# Patient Record
Sex: FEMALE | Race: WHITE | NOT HISPANIC OR LATINO
[De-identification: names, ages, dates, MRNs, and addresses within clinical notes are randomized per-mention and may not be internally consistent; named-entity substitution may affect disease eponyms.]

---

## 2018-08-27 ENCOUNTER — RX RENEWAL (OUTPATIENT)
Age: 58
End: 2018-08-27

## 2018-10-26 ENCOUNTER — RECORD ABSTRACTING (OUTPATIENT)
Age: 58
End: 2018-10-26

## 2018-10-26 DIAGNOSIS — Z82.49 FAMILY HISTORY OF ISCHEMIC HEART DISEASE AND OTHER DISEASES OF THE CIRCULATORY SYSTEM: ICD-10-CM

## 2018-10-26 DIAGNOSIS — N95.1 MENOPAUSAL AND FEMALE CLIMACTERIC STATES: ICD-10-CM

## 2018-10-26 DIAGNOSIS — Z78.9 OTHER SPECIFIED HEALTH STATUS: ICD-10-CM

## 2018-10-26 DIAGNOSIS — Z80.9 FAMILY HISTORY OF MALIGNANT NEOPLASM, UNSPECIFIED: ICD-10-CM

## 2018-10-30 ENCOUNTER — APPOINTMENT (OUTPATIENT)
Dept: ENDOCRINOLOGY | Facility: CLINIC | Age: 58
End: 2018-10-30
Payer: COMMERCIAL

## 2018-10-30 VITALS
WEIGHT: 170 LBS | HEART RATE: 74 BPM | HEIGHT: 67 IN | BODY MASS INDEX: 26.68 KG/M2 | DIASTOLIC BLOOD PRESSURE: 86 MMHG | SYSTOLIC BLOOD PRESSURE: 140 MMHG

## 2018-10-30 PROCEDURE — 99213 OFFICE O/P EST LOW 20 MIN: CPT

## 2019-01-11 ENCOUNTER — APPOINTMENT (OUTPATIENT)
Dept: CARDIOLOGY | Facility: CLINIC | Age: 59
End: 2019-01-11
Payer: COMMERCIAL

## 2019-01-11 VITALS
RESPIRATION RATE: 16 BRPM | DIASTOLIC BLOOD PRESSURE: 80 MMHG | WEIGHT: 180 LBS | BODY MASS INDEX: 28.25 KG/M2 | SYSTOLIC BLOOD PRESSURE: 139 MMHG | HEART RATE: 87 BPM | HEIGHT: 67 IN

## 2019-01-11 DIAGNOSIS — Z00.00 ENCOUNTER FOR GENERAL ADULT MEDICAL EXAMINATION W/OUT ABNORMAL FINDINGS: ICD-10-CM

## 2019-01-11 PROCEDURE — 93000 ELECTROCARDIOGRAM COMPLETE: CPT

## 2019-01-11 PROCEDURE — 99244 OFF/OP CNSLTJ NEW/EST MOD 40: CPT

## 2019-01-22 ENCOUNTER — APPOINTMENT (OUTPATIENT)
Dept: ENDOCRINOLOGY | Facility: CLINIC | Age: 59
End: 2019-01-22
Payer: COMMERCIAL

## 2019-01-22 VITALS
HEIGHT: 67 IN | OXYGEN SATURATION: 96 % | WEIGHT: 179 LBS | DIASTOLIC BLOOD PRESSURE: 72 MMHG | BODY MASS INDEX: 28.09 KG/M2 | SYSTOLIC BLOOD PRESSURE: 119 MMHG | HEART RATE: 86 BPM

## 2019-01-22 PROCEDURE — 99213 OFFICE O/P EST LOW 20 MIN: CPT

## 2019-01-22 NOTE — DATA REVIEWED
[FreeTextEntry1] : Thyroid sono 6/13/18\par Left nodule 9x7x7 mm, stable\par \par Labs 10/18/18\par TSH 0.508\par FT4 1.79\par \par Labs 1/14/19\par TSH 0.681\par FT4 1.45

## 2019-01-22 NOTE — ASSESSMENT
[FreeTextEntry1] : 1. hashimoto's hypothyrod - TSh low normal but she is asymtomatic and levels stable\par no changes in Lt4 dose, repeat TFTs 1 week before next visit\par \par 2. stable thyroid nodule - repeat thyroid sono summer 2019

## 2019-01-22 NOTE — REVIEW OF SYSTEMS
[Insomnia] : insomnia [As Noted in HPI] : as noted in HPI [Recent Weight Gain (___ Lbs)] : no recent weight gain [Recent Weight Loss (___ Lbs)] : no recent weight loss [Palpitations] : no palpitations [Shortness Of Breath] : no shortness of breath [SOB on Exertion] : no shortness of breath during exertion [Constipation] : no constipation [Diarrhea] : no diarrhea [Tremors] : no tremors [Depression] : no depression [Anxiety] : no anxiety [Stress] : no stress [Cold Intolerance] : cold tolerant [Heat Intolerance] : heat tolerant

## 2019-01-22 NOTE — HISTORY OF PRESENT ILLNESS
[FreeTextEntry1] : on Lt4 112 mcg daily on empty stomach\par weight stable, denies palpitations/increased bowel habits\par denies dysphagia, voice changes, anterior neck pain

## 2019-01-22 NOTE — PHYSICAL EXAM
[Alert] : alert [Well Nourished] : well nourished [Well Developed] : well developed [Normal Sclera/Conjunctiva] : normal sclera/conjunctiva [EOMI] : extra ocular movement intact [No LAD] : no lymphadenopathy [Thyroid Not Enlarged] : the thyroid was not enlarged [No Thyroid Nodules] : there were no palpable thyroid nodules [Normal Rate] : heart rate was normal  [Normal S1, S2] : normal S1 and S2 [Normal Reflexes] : deep tendon reflexes were 2+ and symmetric [No Tremors] : no tremors [Oriented x3] : oriented to person, place, and time [Normal Insight/Judgement] : insight and judgment were intact

## 2019-02-25 ENCOUNTER — APPOINTMENT (OUTPATIENT)
Dept: CARDIOLOGY | Facility: CLINIC | Age: 59
End: 2019-02-25
Payer: COMMERCIAL

## 2019-02-25 PROCEDURE — 93306 TTE W/DOPPLER COMPLETE: CPT

## 2019-02-25 PROCEDURE — 93015 CV STRESS TEST SUPVJ I&R: CPT

## 2019-03-01 ENCOUNTER — APPOINTMENT (OUTPATIENT)
Dept: CARDIOLOGY | Facility: CLINIC | Age: 59
End: 2019-03-01
Payer: COMMERCIAL

## 2019-03-01 ENCOUNTER — NON-APPOINTMENT (OUTPATIENT)
Age: 59
End: 2019-03-01

## 2019-03-01 VITALS
RESPIRATION RATE: 16 BRPM | WEIGHT: 189 LBS | HEART RATE: 80 BPM | BODY MASS INDEX: 29.66 KG/M2 | DIASTOLIC BLOOD PRESSURE: 82 MMHG | HEIGHT: 67 IN | SYSTOLIC BLOOD PRESSURE: 136 MMHG

## 2019-03-01 PROCEDURE — 99215 OFFICE O/P EST HI 40 MIN: CPT

## 2019-03-01 PROCEDURE — 93000 ELECTROCARDIOGRAM COMPLETE: CPT

## 2019-03-01 NOTE — CARDIOLOGY SUMMARY
[No Exercise Ind Arr] : no exercise induced arrhythmias [___] : [unfilled] [LVEF ___%] : LVEF [unfilled]% [None] : no pulmonary hypertension [Normal] : normal LA size [Mild] : mild mitral regurgitation

## 2019-03-01 NOTE — REASON FOR VISIT
[FreeTextEntry1] : The patient presents back to the office today for cardiac reevaluation with known history of atypical chest pain syndrome, abnormal EKG and GERD.  Also, Mrs. Sow is here today to review her most recent Exercise Stress Test and Echocardiogram results.  She continues to report right parasternal chest pain that occurs mostly at rest but occasionally with activity.  The patient denies SOB, OSBORNE, PND, orthopnea, palpitations, presyncope, syncope, diaphoresis.

## 2019-03-01 NOTE — DISCUSSION/SUMMARY
[FreeTextEntry1] : 1).  Exercise Nuclear Stress Test to be completed to assess for coronary perfusion in light of her intermittent chest pain symptoms and positive exercise stress test.\par \par 2).  Follow up with PCP (Dr. Grove) regarding routine checkups and blood work, have copy faxed to our office. \par \par 3).  Recommend patient report any untoward symptoms. \par \par 4).  Follow up with our office in 5 to 6 months or PRN.

## 2019-03-01 NOTE — PHYSICAL EXAM
[General Appearance - Well Developed] : well developed [Normal Appearance] : normal appearance [General Appearance - Well Nourished] : well nourished [General Appearance - In No Acute Distress] : no acute distress [Normal Conjunctiva] : the conjunctiva exhibited no abnormalities [Normal Oral Mucosa] : normal oral mucosa [Normal Oropharynx] : normal oropharynx [Normal Jugular Venous A Waves Present] : normal jugular venous A waves present [Normal Jugular Venous V Waves Present] : normal jugular venous V waves present [No Jugular Venous Ward A Waves] : no jugular venous ward A waves [] : no respiratory distress [Respiration, Rhythm And Depth] : normal respiratory rhythm and effort [Auscultation Breath Sounds / Voice Sounds] : lungs were clear to auscultation bilaterally [Heart Rate And Rhythm] : heart rate and rhythm were normal [Heart Sounds] : normal S1 and S2 [Murmurs] : no murmurs present [Edema] : no peripheral edema present [Bowel Sounds] : normal bowel sounds [Abnormal Walk] : normal gait [Nail Clubbing] : no clubbing of the fingernails [Cyanosis, Localized] : no localized cyanosis [Skin Color & Pigmentation] : normal skin color and pigmentation [Oriented To Time, Place, And Person] : oriented to person, place, and time [Impaired Insight] : insight and judgment were intact [Affect] : the affect was normal

## 2019-03-01 NOTE — HISTORY OF PRESENT ILLNESS
[FreeTextEntry1] : She continues to take Omeprazole with significant relief from her heartburn symptoms.

## 2019-03-01 NOTE — ASSESSMENT
[FreeTextEntry1] : EKG 3/1/2019:  The EKG illustrates sinus rhythm, rate of 74, poor R wave progression V1 to V3, some nonspecific ST-T wave changes.

## 2019-03-27 ENCOUNTER — APPOINTMENT (OUTPATIENT)
Dept: CARDIOLOGY | Facility: CLINIC | Age: 59
End: 2019-03-27
Payer: COMMERCIAL

## 2019-03-27 PROCEDURE — 93015 CV STRESS TEST SUPVJ I&R: CPT

## 2019-03-27 PROCEDURE — 78452 HT MUSCLE IMAGE SPECT MULT: CPT

## 2019-03-27 PROCEDURE — A9500: CPT

## 2019-04-05 RX ORDER — KIT FOR THE PREPARATION OF TECHNETIUM TC99M SESTAMIBI 1 MG/5ML
INJECTION, POWDER, LYOPHILIZED, FOR SOLUTION PARENTERAL
Refills: 0 | Status: COMPLETED | OUTPATIENT
Start: 2019-04-05

## 2019-04-05 RX ADMIN — KIT FOR THE PREPARATION OF TECHNETIUM TC99M SESTAMIBI 0: 1 INJECTION, POWDER, LYOPHILIZED, FOR SOLUTION PARENTERAL at 00:00

## 2019-05-30 ENCOUNTER — RX RENEWAL (OUTPATIENT)
Age: 59
End: 2019-05-30

## 2019-05-30 ENCOUNTER — MEDICATION RENEWAL (OUTPATIENT)
Age: 59
End: 2019-05-30

## 2019-07-15 ENCOUNTER — APPOINTMENT (OUTPATIENT)
Dept: ENDOCRINOLOGY | Facility: CLINIC | Age: 59
End: 2019-07-15
Payer: COMMERCIAL

## 2019-07-15 VITALS
HEIGHT: 67 IN | HEART RATE: 82 BPM | WEIGHT: 179 LBS | DIASTOLIC BLOOD PRESSURE: 80 MMHG | SYSTOLIC BLOOD PRESSURE: 130 MMHG | BODY MASS INDEX: 28.09 KG/M2

## 2019-07-15 PROCEDURE — 99213 OFFICE O/P EST LOW 20 MIN: CPT

## 2019-08-21 ENCOUNTER — APPOINTMENT (OUTPATIENT)
Dept: CARDIOLOGY | Facility: CLINIC | Age: 59
End: 2019-08-21
Payer: COMMERCIAL

## 2019-08-21 ENCOUNTER — NON-APPOINTMENT (OUTPATIENT)
Age: 59
End: 2019-08-21

## 2019-08-21 VITALS
DIASTOLIC BLOOD PRESSURE: 72 MMHG | WEIGHT: 179 LBS | RESPIRATION RATE: 16 BRPM | SYSTOLIC BLOOD PRESSURE: 130 MMHG | HEIGHT: 67 IN | BODY MASS INDEX: 28.09 KG/M2 | HEART RATE: 72 BPM

## 2019-08-21 PROCEDURE — 93000 ELECTROCARDIOGRAM COMPLETE: CPT

## 2019-08-21 PROCEDURE — 99214 OFFICE O/P EST MOD 30 MIN: CPT

## 2019-08-21 NOTE — HISTORY OF PRESENT ILLNESS
[FreeTextEntry1] : Earlier in the year in March she underwent a nuclear stress test after having a borderline abnormal regular stress test.\par \par  Fortunately, she had no symptoms of chest pain, no arrhythmias were seen, there was 1 mm again of ST segment depressions in the inferolateral leads. But of additional importance was the myocardial perfusion images which demonstrated normal radioisotope uptake without any evidence of ischemia i.e. negative test;-- LV systolic function was preserved (greater than 65% ejection fraction);

## 2019-08-21 NOTE — ASSESSMENT
[FreeTextEntry1] : EKG demonstrates normal sinus rhythm at a rate of 72 with poor R-wave progression V1 to V4 and otherwise no acute changes- with borderline nonspecific ST-T changes;\par \par In summary the patient is a 59-year-old woman who is had some atypical chest pain in the past which now is felt to be likely musculoskeletal or costochondritis of the chest wall\par \par Recent nuclear stress test was negative for ischemia\par \par Plan:\par \par No additional cardiac workup indicated at this time\par \par Patient recommended to continue to participate in a moderate physical exercise regimen\par \par Timely checkups and laboratory blood tests with primary care encouraged;\par \par ;;;;

## 2019-08-21 NOTE — PHYSICAL EXAM

## 2019-08-21 NOTE — REASON FOR VISIT
[Follow-Up - Clinic] : a clinic follow-up of [FreeTextEntry1] : The patient is a 59-year-old woman with a history for atypical chest pain in the past who returns to the office for general cardiac checkup;\par \par Overall, she states she still gets occasional twinges of chest discomfort and usually the right parasternal border area which is not particularly exertional related;\par \par There has been no shortness of breath, palpitations, dizziness or syncope;

## 2019-12-16 ENCOUNTER — APPOINTMENT (OUTPATIENT)
Dept: PULMONOLOGY | Facility: CLINIC | Age: 59
End: 2019-12-16

## 2020-01-14 ENCOUNTER — APPOINTMENT (OUTPATIENT)
Dept: ENDOCRINOLOGY | Facility: CLINIC | Age: 60
End: 2020-01-14
Payer: COMMERCIAL

## 2020-01-14 VITALS
BODY MASS INDEX: 28.25 KG/M2 | WEIGHT: 180 LBS | DIASTOLIC BLOOD PRESSURE: 80 MMHG | SYSTOLIC BLOOD PRESSURE: 124 MMHG | HEIGHT: 67 IN | HEART RATE: 80 BPM

## 2020-01-14 DIAGNOSIS — Z87.19 PERSONAL HISTORY OF OTHER DISEASES OF THE DIGESTIVE SYSTEM: ICD-10-CM

## 2020-01-14 PROCEDURE — 99213 OFFICE O/P EST LOW 20 MIN: CPT

## 2020-01-14 RX ORDER — OMEPRAZOLE 20 MG/1
20 CAPSULE, DELAYED RELEASE ORAL
Refills: 0 | Status: DISCONTINUED | COMMUNITY
End: 2020-01-14

## 2020-01-14 NOTE — PHYSICAL EXAM
[Alert] : alert [Well Developed] : well developed [Well Nourished] : well nourished [Normal Sclera/Conjunctiva] : normal sclera/conjunctiva [EOMI] : extra ocular movement intact [No LAD] : no lymphadenopathy [Thyroid Not Enlarged] : the thyroid was not enlarged [Normal S1, S2] : normal S1 and S2 [Normal Rate] : heart rate was normal  [No Tremors] : no tremors [Normal Reflexes] : deep tendon reflexes were 2+ and symmetric [Oriented x3] : oriented to person, place, and time [Normal Insight/Judgement] : insight and judgment were intact [de-identified] : nodular thyroid texture

## 2020-01-14 NOTE — DATA REVIEWED
[FreeTextEntry1] : Thyroid sono 6/13/18\par Left nodule 9x7x7 mm, stable\par \par Thyroid sono 6/18/19\par Left lower pole nodule 8x7x7 mm - stable, echogenic\par \par Labs 10/18/18\par TSH 0.508\par FT4 1.79\par \par Labs 1/14/19\par TSH 0.681\par FT4 1.45\par \par Labs 6/27/19\par TSH 1.270\par FT4 1.59\par \par Labs 1/3/20\par TSH 15.710, FT4 1.24

## 2020-01-14 NOTE — REVIEW OF SYSTEMS
[Insomnia] : insomnia [As Noted in HPI] : as noted in HPI [Palpitations] : no palpitations [Shortness Of Breath] : no shortness of breath [SOB on Exertion] : no shortness of breath during exertion [Myalgia] : no myalgia  [Muscle Cramps] : no muscle cramps [Depression] : no depression [Tremors] : no tremors [Anxiety] : no anxiety [Stress] : no stress [Cold Intolerance] : cold tolerant [Heat Intolerance] : heat tolerant [FreeTextEntry9] : Right shoulder "bad" - chronic, prior injury

## 2020-01-14 NOTE — ASSESSMENT
[FreeTextEntry1] : 1. hashimoto's hypothyroid - she is clinically euthyroid, recent TSH elevation ?due to recent strep infection/abx\par - cont LT4 112 mcg daily, repeat TFTs 1 month - will advise further based on results\par \par 2. stable thyroid nodule - repeat thyroid sono summer 2020

## 2020-07-14 ENCOUNTER — APPOINTMENT (OUTPATIENT)
Dept: ENDOCRINOLOGY | Facility: CLINIC | Age: 60
End: 2020-07-14
Payer: COMMERCIAL

## 2020-07-14 VITALS
HEIGHT: 67 IN | HEART RATE: 74 BPM | SYSTOLIC BLOOD PRESSURE: 128 MMHG | BODY MASS INDEX: 27.47 KG/M2 | WEIGHT: 175 LBS | DIASTOLIC BLOOD PRESSURE: 80 MMHG

## 2020-07-14 PROCEDURE — 99214 OFFICE O/P EST MOD 30 MIN: CPT

## 2020-07-14 NOTE — REVIEW OF SYSTEMS
[Fatigue] : fatigue [Recent Weight Gain (___ Lbs)] : recent weight gain: [unfilled] lbs [As Noted in HPI] : as noted in HPI [Palpitations] : no palpitations [Shortness Of Breath] : no shortness of breath [SOB on Exertion] : no shortness of breath on exertion [Constipation] : no constipation [Joint Pain] : no joint pain [Myalgia] : no myalgia  [Dry Skin] : dry skin [Depression] : no depression [Insomnia] : insomnia [Cold Intolerance] : no cold intolerance

## 2020-07-14 NOTE — PHYSICAL EXAM
[Alert] : alert [Well Nourished] : well nourished [Healthy Appearance] : healthy appearance [No Acute Distress] : no acute distress [EOMI] : extra ocular movement intact [No LAD] : no lymphadenopathy [Thyroid Not Enlarged] : the thyroid was not enlarged [No Thyroid Nodules] : no palpable thyroid nodules [No Respiratory Distress] : no respiratory distress [No Accessory Muscle Use] : no accessory muscle use [Clear to Auscultation] : lungs were clear to auscultation bilaterally [Normal Rate] : heart rate was normal [No Edema] : no peripheral edema [Cranial Nerves Intact] : cranial nerves 2-12 were intact [No Tremors] : no tremors [Oriented x3] : oriented to person, place, and time [Normal Affect] : the affect was normal [Normal Insight/Judgement] : insight and judgment were intact [Normal Mood] : the mood was normal [de-identified] : delayed DTRs

## 2020-07-14 NOTE — HISTORY OF PRESENT ILLNESS
[FreeTextEntry1] : Hashimoto's hypothyroid since 29 yrs old - has been on LT4 for long time. \par currently on Lt4 112 mcg daily on empty stomach - ran out of meds 6 weeks ago, due to insurance requiring mail order pharmacy\par weight stable,\par \par Left thyroid nodule since 2011 - stable\par \par denies dysphagia, (+) voice changes, denies anterior neck pain

## 2020-07-14 NOTE — DATA REVIEWED
[FreeTextEntry1] : Thyroid sono 6/13/18\par Left nodule 9x7x7 mm, stable\par \par Thyroid sono 6/18/19\par Left lower pole nodule 8x7x7 mm - stable, echogenic\par \par Thyroid sono 7/10/20\par stable LLP nodule 9x10x9 mm\par \par Labs 10/18/18\par TSH 0.508\par FT4 1.79\par \par Labs 1/14/19\par TSH 0.681\par FT4 1.45\par \par Labs 6/27/19\par TSH 1.270\par FT4 1.59\par \par Labs 1/3/20\par TSH 15.710, FT4 1.24\par \par Labs 7/8/20 \par , Ft4 0.24

## 2020-10-28 ENCOUNTER — APPOINTMENT (OUTPATIENT)
Dept: ENDOCRINOLOGY | Facility: CLINIC | Age: 60
End: 2020-10-28
Payer: COMMERCIAL

## 2020-10-28 VITALS
DIASTOLIC BLOOD PRESSURE: 80 MMHG | BODY MASS INDEX: 27.94 KG/M2 | WEIGHT: 178 LBS | HEART RATE: 78 BPM | HEIGHT: 67 IN | SYSTOLIC BLOOD PRESSURE: 120 MMHG | OXYGEN SATURATION: 99 %

## 2020-10-28 PROCEDURE — 99213 OFFICE O/P EST LOW 20 MIN: CPT | Mod: 25

## 2020-10-28 PROCEDURE — 99072 ADDL SUPL MATRL&STAF TM PHE: CPT

## 2020-10-28 NOTE — HISTORY OF PRESENT ILLNESS
[FreeTextEntry1] : Hashimoto's hypothyroid since 29 yrs old - has been on LT4 for long time. \par currently on Lt4 112 mcg daily on empty stomach\par \par Left thyroid nodule since 2011 - stable\par \par denies dysphagia, denies voice changes, denies anterior neck pain

## 2020-10-28 NOTE — PHYSICAL EXAM
[Alert] : alert [Well Nourished] : well nourished [Healthy Appearance] : healthy appearance [No Acute Distress] : no acute distress [EOMI] : extra ocular movement intact [No LAD] : no lymphadenopathy [Thyroid Not Enlarged] : the thyroid was not enlarged [No Thyroid Nodules] : no palpable thyroid nodules [No Respiratory Distress] : no respiratory distress [No Accessory Muscle Use] : no accessory muscle use [Clear to Auscultation] : lungs were clear to auscultation bilaterally [Normal Rate] : heart rate was normal [No Edema] : no peripheral edema [Cranial Nerves Intact] : cranial nerves 2-12 were intact [No Tremors] : no tremors [Oriented x3] : oriented to person, place, and time [Normal Affect] : the affect was normal [Normal Insight/Judgement] : insight and judgment were intact [Normal Mood] : the mood was normal [de-identified] : delayed DTRs

## 2020-10-28 NOTE — ASSESSMENT
[FreeTextEntry1] : 1. hashimoto's hypothyroid - clinically and biochemically euthyroid\par - cont LT4 112 mcg daily\par - repeat TFTs 1 week before next visit\par \par 2. stable thyroid nodule - repeat thyroid sono summer 2021

## 2020-10-28 NOTE — REVIEW OF SYSTEMS
[Fatigue] : fatigue [As Noted in HPI] : as noted in HPI [Dry Skin] : dry skin [Recent Weight Gain (___ Lbs)] : no recent weight gain [Recent Weight Loss (___ Lbs)] : no recent weight loss [Palpitations] : no palpitations [Shortness Of Breath] : no shortness of breath [SOB on Exertion] : no shortness of breath on exertion [Constipation] : no constipation [Joint Pain] : no joint pain [Myalgia] : no myalgia  [Depression] : no depression [Insomnia] : no insomnia [Cold Intolerance] : no cold intolerance

## 2020-10-28 NOTE — DATA REVIEWED
[FreeTextEntry1] : Thyroid sono 6/13/18\par Left nodule 9x7x7 mm, stable\par \par Thyroid sono 6/18/19\par Left lower pole nodule 8x7x7 mm - stable, echogenic\par \par Thyroid sono 7/10/20\par stable LLP nodule 9x10x9 mm\par \par Labs 10/18/18\par TSH 0.508\par FT4 1.79\par \par Labs 1/14/19\par TSH 0.681\par FT4 1.45\par \par Labs 6/27/19\par TSH 1.270\par FT4 1.59\par \par Labs 1/3/20\par TSH 15.710, FT4 1.24\par \par Labs 7/8/20 \par , Ft4 0.24\par \par Labs 10/16/20 TSH 1.48, Ft4 1.55

## 2021-04-10 ENCOUNTER — EMERGENCY (EMERGENCY)
Facility: HOSPITAL | Age: 61
LOS: 1 days | Discharge: HOME | End: 2021-04-10
Payer: COMMERCIAL

## 2021-04-10 VITALS
SYSTOLIC BLOOD PRESSURE: 145 MMHG | OXYGEN SATURATION: 96 % | DIASTOLIC BLOOD PRESSURE: 77 MMHG | HEART RATE: 86 BPM | TEMPERATURE: 99 F | RESPIRATION RATE: 14 BRPM

## 2021-04-10 LAB — SARS-COV-2 RNA SPEC QL NAA+PROBE: SIGNIFICANT CHANGE UP

## 2021-04-10 PROCEDURE — U0003: CPT

## 2021-04-10 PROCEDURE — U0005: CPT

## 2021-04-10 PROCEDURE — 99283 EMERGENCY DEPT VISIT LOW MDM: CPT

## 2021-04-10 PROCEDURE — 99282 EMERGENCY DEPT VISIT SF MDM: CPT

## 2021-04-10 NOTE — ED PROVIDER NOTE - CLINICAL SUMMARY MEDICAL DECISION MAKING FREE TEXT BOX
Pt nontoxic appearing, stable vitals, ambulatory with stable saturation without supplemental oxygen. PT does not meet criteria listed in most updated guidelines as per [facility] protocol/algorithm for admission at this time. pt advised about self-quarantine instructions until negative test results and/or symptom resolution. pt advised on hand hygiene, monitoring of symptoms, antipyretic use as well as and fu with primary care provider. Instructions given in pre-printed copy. COVID-19 PCR sent and pt given strict return precautions.

## 2021-04-10 NOTE — ED ADULT TRIAGE NOTE - CHIEF COMPLAINT QUOTE
Patient A&Ox4, denies any pain or discomfort. Stated was exposed to Covid positive person. Denies any symptoms.

## 2021-04-10 NOTE — ED ADULT TRIAGE NOTE - PRO INTERPRETER NEED 2
English   Immunization Record/Breastfeeding Log/Bottle Feeding Log/Guide to Postpartum Care/Shaken Baby Prevention Handout/Birth Certificate Instructions

## 2021-04-10 NOTE — ED PROVIDER NOTE - OBJECTIVE STATEMENT
Pt presenting to the ER for COVID-19 testing. Pt states she had a + COVID-19 exposure and would like testing at this time. Pt has no symptoms or complaints.

## 2021-04-10 NOTE — ED PROVIDER NOTE - PATIENT PORTAL LINK FT
You can access the FollowMyHealth Patient Portal offered by [facility] by registering at the following website: [email or web address]. By joining [email or web address]’s FollowMyHealth portal, you will also be able to view your health information using other applications (apps) compatible with our system.

## 2021-04-16 ENCOUNTER — EMERGENCY (EMERGENCY)
Facility: HOSPITAL | Age: 61
LOS: 1 days | Discharge: HOME | End: 2021-04-16
Payer: COMMERCIAL

## 2021-04-16 VITALS
SYSTOLIC BLOOD PRESSURE: 157 MMHG | OXYGEN SATURATION: 96 % | DIASTOLIC BLOOD PRESSURE: 87 MMHG | RESPIRATION RATE: 19 BRPM | HEART RATE: 85 BPM | TEMPERATURE: 99 F

## 2021-04-16 LAB — SARS-COV-2 RNA SPEC QL NAA+PROBE: DETECTED

## 2021-04-16 PROCEDURE — U0003: CPT

## 2021-04-16 PROCEDURE — 99284 EMERGENCY DEPT VISIT MOD MDM: CPT

## 2021-04-16 PROCEDURE — U0005: CPT

## 2021-04-16 PROCEDURE — 99283 EMERGENCY DEPT VISIT LOW MDM: CPT

## 2021-04-16 NOTE — ED PROVIDER NOTE - OBJECTIVE STATEMENT
60 ..y/o F presents to ED for COVID testing. Pt presents with nasal congestion. No chest pain, sob, cough, abd pain, n/v/d, headache, dizziness. + sick contacts. No recent travel. Pt well appearing. NAD.

## 2021-04-16 NOTE — ED PROVIDER NOTE - PATIENT PORTAL LINK FT
You can access the FollowMyHealth Patient Portal offered by [facility] by registering at the following website: [email or web address]. By joining [employer]’s FollowMyHealth portal, you will also be able to view your health information using other applications (apps) compatible with our system.

## 2021-04-16 NOTE — ED PROVIDER NOTE - CLINICAL SUMMARY MEDICAL DECISION MAKING FREE TEXT BOX
Pt nontoxic appearing, stable vitals, ambulatory with stable saturation without supplemental oxygen. PT does not meet criteria listed in most updated guidelines as per [facility] protocol/algorithm for admission at this time. pt advised about self-quarantine instructions until negative test results and/or symptom resolution. pt advised on hand hygiene, monitoring of symptoms, antipyretic use as well as and fu with primary care provider. Instructions given in pre-printed copy.

## 2021-07-07 ENCOUNTER — APPOINTMENT (OUTPATIENT)
Dept: ENDOCRINOLOGY | Facility: CLINIC | Age: 61
End: 2021-07-07
Payer: COMMERCIAL

## 2021-07-07 VITALS
WEIGHT: 175 LBS | HEART RATE: 72 BPM | OXYGEN SATURATION: 96 % | DIASTOLIC BLOOD PRESSURE: 80 MMHG | SYSTOLIC BLOOD PRESSURE: 122 MMHG | BODY MASS INDEX: 27.47 KG/M2 | HEIGHT: 67 IN

## 2021-07-07 DIAGNOSIS — U07.1 COVID-19: ICD-10-CM

## 2021-07-07 PROCEDURE — 99214 OFFICE O/P EST MOD 30 MIN: CPT

## 2021-07-07 PROCEDURE — 99072 ADDL SUPL MATRL&STAF TM PHE: CPT

## 2021-07-07 RX ORDER — TOBRAMYCIN AND DEXAMETHASONE 3; 1 MG/ML; MG/ML
0.3-0.1 SUSPENSION/ DROPS OPHTHALMIC
Qty: 5 | Refills: 0 | Status: DISCONTINUED | COMMUNITY
Start: 2020-02-17 | End: 2021-07-07

## 2021-07-07 NOTE — REVIEW OF SYSTEMS
[As Noted in HPI] : as noted in HPI [Dry Skin] : dry skin [Recent Weight Gain (___ Lbs)] : no recent weight gain [Recent Weight Loss (___ Lbs)] : no recent weight loss [Palpitations] : no palpitations [Shortness Of Breath] : no shortness of breath [SOB on Exertion] : no shortness of breath on exertion [Constipation] : no constipation [Joint Pain] : no joint pain [Myalgia] : no myalgia  [Depression] : no depression [Insomnia] : no insomnia [Cold Intolerance] : no cold intolerance

## 2021-07-07 NOTE — DATA REVIEWED
[FreeTextEntry1] : Thyroid sono 6/13/18\par Left nodule 9x7x7 mm, stable\par \par Thyroid sono 6/18/19\par Left lower pole nodule 8x7x7 mm - stable, echogenic\par \par Thyroid sono 7/10/20\par stable LLP nodule 9x10x9 mm\par ------------------------------------------------------------------------------\par Labs 10/18/18\par TSH 0.508\par FT4 1.79\par \par Labs 1/14/19\par TSH 0.681\par FT4 1.45\par \par Labs 6/27/19\par TSH 1.270\par FT4 1.59\par \par Labs 1/3/20 TSH 15.710, FT4 1.24\par \par Labs 7/8/20  , Ft4 0.24\par \par Labs 10/16/20 TSH 1.48, Ft4 1.55\par \par labs 6/29/21 TSH 0.359, Ft4 1.33

## 2021-07-07 NOTE — HISTORY OF PRESENT ILLNESS
[FreeTextEntry1] : Interval Hx - s/p COVID 4/2021\par \par Hashimoto's hypothyroid since 29 yrs old - has been on LT4 for long time. \par currently on Lt4 112 mcg daily on empty stomach\par \par Left thyroid nodule since 2011 - stable\par \par denies dysphagia, denies voice changes, denies anterior neck pain

## 2021-07-07 NOTE — ASSESSMENT
[FreeTextEntry1] : 1. hashimoto's hypothyroid - clinically and biochemically euthyroid, TSH low normal ?due to recent covid infx\par - cont LT4 112 mcg daily, repeat TFTs 8 weeks to monitor\par - repeat TFTs 1 week before next visit\par \par 2. stable thyroid nodule - repeat thyroid sono due to monitor size and appearance of nodule

## 2021-07-07 NOTE — PHYSICAL EXAM
[Alert] : alert [Well Nourished] : well nourished [Healthy Appearance] : healthy appearance [No Acute Distress] : no acute distress [EOMI] : extra ocular movement intact [No LAD] : no lymphadenopathy [Thyroid Not Enlarged] : the thyroid was not enlarged [No Thyroid Nodules] : no palpable thyroid nodules [No Respiratory Distress] : no respiratory distress [No Accessory Muscle Use] : no accessory muscle use [Clear to Auscultation] : lungs were clear to auscultation bilaterally [Normal Rate] : heart rate was normal [No Edema] : no peripheral edema [Cranial Nerves Intact] : cranial nerves 2-12 were intact [No Tremors] : no tremors [Oriented x3] : oriented to person, place, and time [Normal Affect] : the affect was normal [Normal Insight/Judgement] : insight and judgment were intact [Normal Mood] : the mood was normal [de-identified] : delayed DTRs

## 2021-08-27 ENCOUNTER — RX RENEWAL (OUTPATIENT)
Age: 61
End: 2021-08-27

## 2021-09-09 ENCOUNTER — NON-APPOINTMENT (OUTPATIENT)
Age: 61
End: 2021-09-09

## 2021-09-10 ENCOUNTER — NON-APPOINTMENT (OUTPATIENT)
Age: 61
End: 2021-09-10

## 2021-09-10 ENCOUNTER — APPOINTMENT (OUTPATIENT)
Dept: CARDIOLOGY | Facility: CLINIC | Age: 61
End: 2021-09-10
Payer: COMMERCIAL

## 2021-09-10 VITALS
BODY MASS INDEX: 26.68 KG/M2 | HEART RATE: 75 BPM | RESPIRATION RATE: 16 BRPM | HEIGHT: 67 IN | WEIGHT: 170 LBS | DIASTOLIC BLOOD PRESSURE: 80 MMHG | SYSTOLIC BLOOD PRESSURE: 128 MMHG

## 2021-09-10 DIAGNOSIS — R07.9 CHEST PAIN, UNSPECIFIED: ICD-10-CM

## 2021-09-10 DIAGNOSIS — Z86.79 PERSONAL HISTORY OF OTHER DISEASES OF THE CIRCULATORY SYSTEM: ICD-10-CM

## 2021-09-10 PROCEDURE — 93000 ELECTROCARDIOGRAM COMPLETE: CPT

## 2021-09-10 PROCEDURE — 99214 OFFICE O/P EST MOD 30 MIN: CPT

## 2021-09-10 NOTE — PHYSICAL EXAM
[Well Developed] : well developed [Well Nourished] : well nourished [No Acute Distress] : no acute distress [Normal Conjunctiva] : normal conjunctiva [Normal Venous Pressure] : normal venous pressure [No Carotid Bruit] : no carotid bruit [Normal S1, S2] : normal S1, S2 [No Murmur] : no murmur [No Rub] : no rub [No Gallop] : no gallop [Clear Lung Fields] : clear lung fields [Good Air Entry] : good air entry [No Respiratory Distress] : no respiratory distress  [Soft] : abdomen soft [Non Tender] : non-tender [No Masses/organomegaly] : no masses/organomegaly [Normal Bowel Sounds] : normal bowel sounds [Abnormal Gait] : abnormal gait [No Edema] : no edema [No Cyanosis] : no cyanosis [No Clubbing] : no clubbing [No Varicosities] : no varicosities [No Rash] : no rash [No Skin Lesions] : no skin lesions [Moves all extremities] : moves all extremities [No Focal Deficits] : no focal deficits [Normal Speech] : normal speech [Alert and Oriented] : alert and oriented [Normal memory] : normal memory

## 2021-09-10 NOTE — REASON FOR VISIT
[Arrhythmia/ECG Abnorrmalities] : arrhythmia/ECG abnormalities [FreeTextEntry3] : Sergio Grove [FreeTextEntry1] : The patient is a 61-year-old white female who has a history for borderline abnormal he Hg and prior remote history for atypical chest pain. She presents back to the office today in anticipation of cardiac clearance to undergo arthroscopic left knee surgery with Dr. Nacho Mott;\par This is tentatively scheduled for September 14, 2021 at the [facility];\par \par Patient was noted to have a preop testing and abnormal EKG;\par Otherwise, she has been asymptomatic for chest pain, shortness of breath, palpitations or dizziness;

## 2021-09-10 NOTE — ASSESSMENT
[FreeTextEntry1] : EKG shows normal sinus rhythm at a rate of 75 with some late R wave transition V1 to V3. Essentially unchanged.\par \par In summary this 61-year-old woman who has a history of borderline abnormal EKG in the past but otherwise normal cardiac evaluation and normal myocardial perfusion stress test is facing arthroscopic surgery in the near future for her left knee;\par \par \par Plan:\par \par No additional cardiac workup is indicated at this time;\par \par Based on this patient's otherwise stable cardiac pattern and cardiac evaluation, there is no absolute cardiac contraindication for her to undergo the proposed arthroscopic surgical procedure;\par \par Followup to this office is planned within 5-6 months or p.r.n.;\par

## 2021-09-10 NOTE — REVIEW OF SYSTEMS
[Knee Problem] : knee problems [Joint Pain] : joint pain [Knee Pain] : knee pain [Negative] : Heme/Lymph
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
## 2021-09-15 ENCOUNTER — NON-APPOINTMENT (OUTPATIENT)
Age: 61
End: 2021-09-15

## 2021-11-17 ENCOUNTER — APPOINTMENT (OUTPATIENT)
Dept: ENDOCRINOLOGY | Facility: CLINIC | Age: 61
End: 2021-11-17
Payer: COMMERCIAL

## 2021-11-17 VITALS
HEIGHT: 67 IN | WEIGHT: 170 LBS | SYSTOLIC BLOOD PRESSURE: 118 MMHG | HEART RATE: 84 BPM | BODY MASS INDEX: 26.68 KG/M2 | DIASTOLIC BLOOD PRESSURE: 70 MMHG

## 2021-11-17 PROCEDURE — 99214 OFFICE O/P EST MOD 30 MIN: CPT

## 2021-11-17 NOTE — HISTORY OF PRESENT ILLNESS
[FreeTextEntry1] : Interval Hx - currentle has laryngitis\par \par Hashimoto's hypothyroid since 29 yrs old - has been on LT4 for long time. \par currently on Lt4 112 mcg daily on empty stomach\par \par Left thyroid nodule since 2011 - stable\par \par denies dysphagia, denies voice changes, denies anterior neck pain

## 2021-11-17 NOTE — PHYSICAL EXAM
[Alert] : alert [Well Nourished] : well nourished [Healthy Appearance] : healthy appearance [No Acute Distress] : no acute distress [EOMI] : extra ocular movement intact [No LAD] : no lymphadenopathy [Thyroid Not Enlarged] : the thyroid was not enlarged [No Thyroid Nodules] : no palpable thyroid nodules [No Accessory Muscle Use] : no accessory muscle use [Clear to Auscultation] : lungs were clear to auscultation bilaterally [Normal Rate] : heart rate was normal [No Edema] : no peripheral edema [Cranial Nerves Intact] : cranial nerves 2-12 were intact [No Tremors] : no tremors [Oriented x3] : oriented to person, place, and time [Normal Affect] : the affect was normal [Normal Insight/Judgement] : insight and judgment were intact [Normal Mood] : the mood was normal [Normal S1, S2] : normal S1 and S2 [Normal Reflexes] : deep tendon reflexes were 2+ and symmetric

## 2021-11-17 NOTE — DATA REVIEWED
[FreeTextEntry1] : Thyroid sono 6/13/18\par Left nodule 9x7x7 mm, stable\par \par Thyroid sono 6/18/19\par Left lower pole nodule 8x7x7 mm - stable, echogenic\par \par Thyroid sono 7/10/20\par stable LLP nodule 9x10x9 mm\par \par Thyroid sono 9/2/21\par RUP nodule 4x3x3 mm - new, solid\par LLP nodule 8x6x8 mm - stable, heterogenous, solid\par ------------------------------------------------------------------------------\par Labs 10/18/18\par TSH 0.508\par FT4 1.79\par \par Labs 1/14/19\par TSH 0.681\par FT4 1.45\par \par Labs 6/27/19\par TSH 1.270\par FT4 1.59\par \par Labs 1/3/20 TSH 15.710, FT4 1.24\par \par Labs 7/8/20  , Ft4 0.24\par \par Labs 10/16/20 TSH 1.48, Ft4 1.55\par \par labs 6/29/21 TSH 0.359, Ft4 1.33\par \par labs 11/1/21 TSH 0.838, Ft4 1.32

## 2021-11-17 NOTE — ASSESSMENT
[FreeTextEntry1] : 1. hashimoto's hypothyroid - clinically and biochemically euthyroid, TSH normal\par - cont LT4 112 mcg daily\par - repeat TFTs 1 week before next visit\par \par 2. stable Left thyroid nodule, new small Right thyroid nodule - she is asx, repeat thyroid 9/2022 due to monitor size and appearance of nodules

## 2021-12-07 ENCOUNTER — EMERGENCY (EMERGENCY)
Facility: HOSPITAL | Age: 61
LOS: 1 days | Discharge: HOME | End: 2021-12-07
Attending: EMERGENCY MEDICINE
Payer: COMMERCIAL

## 2021-12-07 VITALS
WEIGHT: 169.98 LBS | OXYGEN SATURATION: 97 % | DIASTOLIC BLOOD PRESSURE: 85 MMHG | RESPIRATION RATE: 16 BRPM | HEART RATE: 118 BPM | SYSTOLIC BLOOD PRESSURE: 153 MMHG | TEMPERATURE: 98 F | HEIGHT: 67 IN

## 2021-12-07 PROCEDURE — 99284 EMERGENCY DEPT VISIT MOD MDM: CPT

## 2021-12-07 RX ORDER — ACETAMINOPHEN 500 MG
650 TABLET ORAL ONCE
Refills: 0 | Status: COMPLETED | OUTPATIENT
Start: 2021-12-07 | End: 2021-12-07

## 2021-12-07 RX ORDER — HYDROXYZINE HCL 10 MG
50 TABLET ORAL ONCE
Refills: 0 | Status: COMPLETED | OUTPATIENT
Start: 2021-12-07 | End: 2021-12-07

## 2021-12-07 RX ORDER — HYDROXYZINE HCL 10 MG
1 TABLET ORAL
Qty: 16 | Refills: 0
Start: 2021-12-07 | End: 2021-12-10

## 2021-12-07 RX ORDER — HYDROCORTISONE 1 %
1 OINTMENT (GRAM) TOPICAL ONCE
Refills: 0 | Status: COMPLETED | OUTPATIENT
Start: 2021-12-07 | End: 2021-12-07

## 2021-12-07 RX ORDER — CETIRIZINE HYDROCHLORIDE 10 MG/1
1 TABLET ORAL
Qty: 14 | Refills: 0
Start: 2021-12-07 | End: 2021-12-20

## 2021-12-07 RX ORDER — ACETAMINOPHEN 500 MG
3 TABLET ORAL
Qty: 60 | Refills: 0
Start: 2021-12-07 | End: 2021-12-11

## 2021-12-07 RX ADMIN — Medication 60 MILLIGRAM(S): at 08:39

## 2021-12-07 RX ADMIN — Medication 50 MILLIGRAM(S): at 08:39

## 2021-12-07 RX ADMIN — Medication 650 MILLIGRAM(S): at 08:38

## 2021-12-07 RX ADMIN — Medication 1 APPLICATION(S): at 08:38

## 2021-12-07 NOTE — ED ADULT NURSE NOTE - PAIN: PRESENCE, MLM
Visit 1 of 2    The patient was seen by outpatient psychologist Melanie Merrill Psy.D., L.P. and Sonny Shetty MD. The limits of confidentiality were reviewed at the onset of the session. A psychosocial interview was conducted with Elizabeth and his mother. Information in relation to presenting concerns, developmental history, family history of mental illness and substance use, medical history, social history, school history, previous mental health services, past and current symptoms was obtained during the interview. In addition to the interview, Elizabeth completed a Child Depression Inventory  (CDI) and the Multidimensional Anxiety Scale for Children (MASC). His mother completed the Behavior Assessment System for Children, Second Edition (BASC-2) - Parent Rating Scales. Initial impressions were that he may be experiences symptoms related to anxiety. Based on the information provided it was determined that it additional testing to confirm would be completed. Elizabeth will be returning on July 27th to complete the Structured Interview for Prodrome Syndromes and cognitive testing. The evaluation will be completed at that time.     denies pain/discomfort

## 2021-12-07 NOTE — ED ADULT NURSE NOTE - CHPI ED NUR SYMPTOMS POS
Assessment/Plan:    No problem-specific Assessment & Plan notes found for this encounter  Diagnoses and all orders for this visit:    Viral syndrome    History of acute otitis media  Comments:  resolving    Other orders  -     acetaminophen (TYLENOL) 100 mg/mL solution; Take 10 mg/kg by mouth every 4 (four) hours as needed for fever      patient has a history of otitis media and now she is poking at her ears again, TMs are clear on exam, should finish her amoxicillin course, low grade fever and poking at ears may be due to viral illness verses teething pain, use Tylenol as needed  Patient Instructions   Viral Syndrome in [id number] [facility]  S W:   Viral syndrome is a general term used for a viral infection that has no clear cause  Your child may have a fever, muscle aches, or vomiting  Other symptoms include a cough, chest congestion, or nasal congestion (stuffy nose)  DISCHARGE INSTRUCTIONS:   Call 911 for the following:     · Your child has trouble breathing or he is breathing very fast     · Your child is leaning forward and drooling  · Your child's lips, tongue, or nails, are blue  · Your child cannot be woken  Return to the emergency department if:   · Your child complains of a stiff neck and a bad headache  · Your child has a dry mouth, cracked lips, cries without tears, or is dizzy  · Your child's soft spot on his head is sunken in or bulging out  · Your child coughs up blood or thick yellow, or green, mucus  · Your child is very weak or confused  · Your child stops urinating or urinates a lot less than normal      · Your child has severe abdominal pain or his abdomen is larger than normal   Contact your child's healthcare provider if:   · Your child has a fever for more than 3-5 days  · Your child's symptoms do not get better with treatment  · Your child is not taking any fluids or foods    · Your child has a rash, ear pain  or a sore throat       · Your child has pain when he urinates  · Your child is irritable and fussy, and you cannot calm him down  · You have questions or concerns about your child's condition or care  Medicines: Your child may need the following:  · Acetaminophen  decreases pain and fever  It is available without a doctor's order  Ask how much medicine to give your child and how often to give it  Follow directions  Acetaminophen can cause liver damage if not taken correctly  · NSAIDs , such as ibuprofen, help decrease swelling, pain, and fever  This medicine is available with or without a doctor's order  NSAIDs can cause stomach bleeding or kidney problems in certain people  If your child takes blood thinner medicine, always ask if NSAIDs are safe for him  Always read the medicine label and follow directions  Do not give these medicines to children under 10months of age without direction from your child's healthcare provider  · Do not give aspirin to children under 25years of age  Your child could develop Reye syndrome if he takes aspirin  Reye syndrome can cause life-threatening brain and liver damage  Check your child's medicine labels for aspirin, salicylates, or oil of wintergreen  · Give your child's medicine as directed  Contact your child's healthcare provider if you think the medicine is not working as expected  Tell him or her if your child is allergic to any medicine  Keep a current list of the medicines, vitamins, and herbs your child takes  Include the amounts, and when, how, and why they are taken  Bring the list or the medicines in their containers to follow-up visits  Carry your child's medicine list with you in case of an emergency  Follow up with your child's healthcare provider as directed:  Write down your questions so you remember to ask them during your visits  Care for your child at home:   · Use a cool-mist humidifier  to help your child breathe easier if he has nasal or chest congestion   Ask his healthcare provider how to use a cool-mist humidifier  · Give saline nose drops  to your baby if he has nasal congestion  Place a few saline drops into each nostril  Gently insert a suction bulb to remove the mucus  · Give your child plenty of liquids  to prevent dehydration  Examples include water, ice pops, flavored gelatin, and broth  Ask how much liquid your child should drink each day and which liquids are best for him  You may need to give your child an oral electrolyte solution if he is vomiting or has diarrhea  Do not give your child liquids with caffeine  Liquids with caffeine can make dehydration worse  · Have your child rest   Rest may help your child feel better faster  Have your child take several naps throughout the day  · Have your child wash his hands frequently  Wash your baby's or young child's hands for him  This will help prevent the spread of germs to others  Use soap and water  Use gel hand  when soap and water are not available  · Check your child's temperature as directed  This will help you monitor your child's condition  Ask your child's healthcare provider how often to check his temperature  © 2017 [facility] Information is for End User's use only and may not be sold, redistributed or otherwise used for commercial purposes  All illustrations and images included in CareNotes® are the copyrighted property of A [employer]  or [employer]  The above information is an  only  It is not intended as medical advice for individual conditions or treatments  Talk to your doctor, nurse or pharmacist before following any medical regimen to see if it is safe and effective for you  Tylenol 2 5 ml every 4 hours as needed      Subjective:      Patient ID: Olvin Kaplan is a 10 m o  female      Patient seen with father, was seen 9 days ago for fever and had otitis media, is on the 9th day of amoxicillin, for the last couple of days seems to be poking at her ears and had a low-grade fever, she is eating well but was a little fussy last night, family wanted her checked, no coughing, does have a slight runny nose, and dad think she is teething, they have been using tylenol but only 1 25 ml      The following portions of the patient's history were reviewed and updated as appropriate:   She There are no active problems to display for this patient  Current Outpatient Medications   Medication Sig Dispense Refill    acetaminophen (TYLENOL) 100 mg/mL solution Take 10 mg/kg by mouth every 4 (four) hours as needed for fever      amoxicillin (AMOXIL) 400 MG/5ML suspension Take 5 mL (400 mg total) by mouth 2 (two) times a day for 10 days 100 mL 0     No current facility-administered medications for this visit  She has No Known Allergies       Review of Systems   Constitutional: Positive for fever and irritability  Negative for activity change and appetite change  HENT: Positive for congestion  Negative for ear discharge, rhinorrhea and sneezing  Eyes: Negative for discharge and redness  Respiratory: Negative for cough  Gastrointestinal: Negative for constipation, diarrhea and vomiting  Genitourinary: Negative for decreased urine volume  Skin: Negative for rash  Objective:      Pulse 120   Temp (!) 100 °F (37 8 °C)   Resp (!) 22   Wt 9 526 kg (21 lb)          Physical Exam   Constitutional: She appears well-developed and well-nourished  No distress  HENT:   Head: Anterior fontanelle is flat  Right Ear: Tympanic membrane normal    Left Ear: Tympanic membrane normal    Nose: Nasal discharge (clear) present  Mouth/Throat: Mucous membranes are moist    She is cutting teeth   Eyes: Red reflex is present bilaterally  Pupils are equal, round, and reactive to light  Conjunctivae are normal    Neck: Normal range of motion     Cardiovascular: Normal rate, regular rhythm, S1 normal and S2 normal    Pulmonary/Chest: Effort normal and breath sounds normal    Abdominal: Soft  Bowel sounds are normal  She exhibits no mass  There is no hepatosplenomegaly  Genitourinary: No labial fusion  Lymphadenopathy: No occipital adenopathy is present  She has no cervical adenopathy  Neurological: She is alert  She has normal strength  Skin: Skin is warm  Vitals reviewed  HIVES/ITCHING

## 2021-12-07 NOTE — ED PROVIDER NOTE - CLINICAL SUMMARY MEDICAL DECISION MAKING FREE TEXT BOX
PT with stable VS, no acute distress, non toxic appearing, tolerating PO in the ED, Pt with no s/s of anaphylaxis and or angioedema, no resp involvement, with likely environmental exposure causing rash, will dc home with antihistamines, dc home with follow up to PCP, educated about when to return to the ED if needed. PT verbalizes that he understands all instructions and results. Pt informed that ED is open and available 24/7 365 days a yr, encouraged to return to the ED if they have any change in condition, or feel the need for revaluation.

## 2021-12-07 NOTE — ED PROVIDER NOTE - PATIENT PORTAL LINK FT
You can access the FollowMyHealth Patient Portal offered by [facility] by registering at the following website: [email or web address]. By joining [facility]’s FollowMyHealth portal, you will also be able to view your health information using other applications (apps) compatible with our system.

## 2021-12-07 NOTE — ED ADULT TRIAGE NOTE - CHIEF COMPLAINT QUOTE
Ambulatory complaining of generalized hives since yesterday. Patient states that she doesn't have any allergies and hasn't introduced anything new into her life. Tolerating secretions, NAD. Took 50mg Benadryl @1am without relief.

## 2021-12-07 NOTE — ED PROVIDER NOTE - CARE PROVIDER_API CALL
Sunil Ashley)  [facility]  [address] [place] [address]  Phone: [phone]  Fax: [phone]  Follow Up Time:

## 2021-12-07 NOTE — ED PROVIDER NOTE - NSFOLLOWUPINSTRUCTIONS_ED_ALL_ED_FT
Allergies    WHAT YOU NEED TO KNOW:    Allergies are an immune system reaction to a substance called an allergen. Your immune system sees the allergen as harmful and attacks it. An allergic reaction can be mild or life-threatening. A life-threatening reaction is called anaphylaxis. Anaphylaxis is a sudden, life-threatening reaction that needs immediate treatment.    DISCHARGE INSTRUCTIONS:    Call 911 for signs or symptoms of anaphylaxis, such as trouble breathing, swelling in your mouth or throat, or wheezing. You may also have itching, a rash, hives, or feel like you are going to faint.    Return to the emergency department if:   •You have tingling in your hands or feet.       •Your skin is red or flushed.       Contact your healthcare provider if:   •You have questions or concerns about your condition or care.          Medicines:   •Antihistamines help decrease itching, sneezing, and swelling. You may take them as a pill or use drops in your nose or eyes.       •Decongestants help your nose feel less stuffy.       •Topical treatments help decrease itching or swelling. You also may be given nasal sprays or eyedrops.       •Epinephrine is used to treat a severe allergic reaction such as anaphylaxis.       •Take your medicine as directed. Contact your healthcare provider if you think your medicine is not helping or if you have side effects. Tell him of her if you are allergic to any medicine. Keep a list of the medicines, vitamins, and herbs you take. Include the amounts, and when and why you take them. Bring the list or the pill bottles to follow-up visits. Carry your medicine list with you in case of an emergency.      Steps to take for signs or symptoms of anaphylaxis:   •Immediately give 1 shot of epinephrine only into the outer thigh muscle.       •Leave the shot in place as directed. Your healthcare provider may recommend you leave it in place for up to 10 seconds before you remove it. This helps make sure all of the epinephrine is delivered.       •Call 911 and go to the emergency department, even if the shot improved symptoms. Do not drive yourself. Bring the used epinephrine shot with you.       Safety precautions to take if you are at risk for anaphylaxis:   •Keep 2 shots of epinephrine with you at all times. You may need a second shot, because epinephrine only works for about 20 minutes and symptoms may return. Your healthcare provider can show you and family members how to give the shot. Check the expiration date every month and replace it before it expires.      •Create an action plan. Your healthcare provider can help you create a written plan that explains the allergy and an emergency plan to treat a reaction. The plan explains when to give a second epinephrine shot if symptoms return or do not improve after the first. Give copies of the action plan and emergency instructions to family members and work staff. Show them how to give a shot of epinephrine.      •Be careful when you exercise. If you have had exercise-induced anaphylaxis, do not exercise right after you eat. Stop exercising right away if you start to develop any signs or symptoms of anaphylaxis. You may first feel tired, warm, or have itchy skin. Hives, swelling, and severe breathing problems may develop if you continue to exercise.      •Carry medical alert identification. Wear medical alert jewelry or carry a card that explains the allergy. Ask your healthcare provider where to get these items.   Medical Alert Jewelry           •Inform all healthcare providers of the allergy. This includes dentists, nurses, doctors, and surgeons.       Manage allergies:   •Use nasal rinses as directed. Rinse with a saline solution daily. This will help clear allergens out of your nose. Use distilled water if possible. You can also boil tap water and let it cool before you use it. Do not use tap water that has not been boiled.      •Do not smoke. Allergy symptoms may decrease if you are not around smoke. Nicotine and other chemicals in cigarettes and cigars can cause lung damage. Ask your healthcare provider for information if you currently smoke and need help to quit. E-cigarettes or smokeless tobacco still contain nicotine. Talk to your healthcare provider before you use these products.       Prevent allergic reactions:   •Do not go outside when pollen counts are high if you have seasonal allergies. Your symptoms may be better if you go outside only in the morning or evening. Use your air conditioner, and change air filters often.       •Avoid dust, fur, and mold. Dust and vacuum your home often. You may want to wear a mask when you vacuum. Keep pets in certain rooms, and bathe them often. Use a dehumidifier (machine that decreases moisture) to help prevent mold.       •Do not use products that contain latex if you have a latex allergy. Use nonlatex gloves if you work in healthcare or in food preparation. Always tell healthcare providers about a latex allergy.       •Avoid areas that attract insects if you have an insect bite or sting allergy. Areas include trash cans, gardens, and picnics. Do not wear bright clothing or strong scents when you will be outside.      •Prevent an allergic reaction caused by food. You may have a reaction if your food is not prepared safely. For example, you could be served food that touched your trigger food during preparation. This is called cross-contamination. Kitchen tools can also cause cross-contamination. You may also eat baked foods that contain a trigger food you do not know about. Ask if the food contains your trigger food before you handle or eat it.      Follow up with your healthcare provider as directed: Write down your questions so you remember to ask them during your visits. When you have an allergic reaction, write down everything you were exposed to in the 2 hours before the reaction. Take that information to your next visit.

## 2021-12-07 NOTE — ED PROVIDER NOTE - ENMT, MLM
Head NC/AT, Airway patent, Nasal mucosa clear. Mouth with normal mucosa. Throat has no vesicles, no oropharyngeal exudates and uvula is midline.

## 2021-12-07 NOTE — ED PROVIDER NOTE - ADDITIONAL NOTES AND INSTRUCTIONS:
PT was evaluated At [facility] ED and was found to have a condition that warranted time of to rest and heal from WORK/SCHOOL.   Clark Roy PA-C

## 2021-12-07 NOTE — ED PROVIDER NOTE - OBJECTIVE STATEMENT
HPI: 62yo F with PMHx of hypothyroidism presents complaining of a non-radiating constant rash on her legs, arms, back, neck, and scalp that started yesterday morning. Pt describes it as itchy and painful at times. Pt said she tried taking benadryl which did not seem to help. Nothing makes it better or worse. Pt denies any new detergents, lotions, shampoos or other products and denies any new foods. Pt reports raking her yard Sunday but other than that no other outdoor activities. HPI: 60yo F with PMHx of hypothyroidism presents complaining of a non-radiating constant rash on her legs, arms, back, neck, and scalp that started yesterday morning. Pt describes it as intensely itchy and mildly painful at times. Pt said she tried taking 50mg Benadryl which did not help. Nothing makes it better or worse. Pt denies any new detergents, lotions, shampoos or other products and denies any new foods. Pt reports raking her yard Sunday but other than that no other outdoor activities. Denies fever, chills, SOB, CP, dyspnea, dysphagia, and odynophagia.

## 2021-12-07 NOTE — ED PROVIDER NOTE - ATTENDING CONTRIBUTION TO CARE
62 y/o F presents for non-radiating rash on her legs, arms, back, neck, scalp that started yesterday, as well as one lesion on the palm of her hand. Only new medication was Sudaphed the day before the rash started.    AP - diffuse urticaria on legs, arms, back, neck and scalp, no intraoral lesions.    Atarax, steroids, allergist follow up.

## 2022-04-06 NOTE — ED ADULT TRIAGE NOTE - ACCOMPANIED BY
Self Topical Retinoid Pregnancy And Lactation Text: This medication is Pregnancy Category C. It is unknown if this medication is excreted in breast milk.

## 2022-05-18 ENCOUNTER — APPOINTMENT (OUTPATIENT)
Dept: ENDOCRINOLOGY | Facility: CLINIC | Age: 62
End: 2022-05-18
Payer: COMMERCIAL

## 2022-05-18 VITALS
OXYGEN SATURATION: 98 % | HEART RATE: 74 BPM | HEIGHT: 67 IN | DIASTOLIC BLOOD PRESSURE: 76 MMHG | WEIGHT: 180 LBS | BODY MASS INDEX: 28.25 KG/M2 | SYSTOLIC BLOOD PRESSURE: 120 MMHG

## 2022-05-18 PROCEDURE — 99214 OFFICE O/P EST MOD 30 MIN: CPT

## 2022-05-18 NOTE — REVIEW OF SYSTEMS
[Recent Weight Gain (___ Lbs)] : recent weight gain: [unfilled] lbs [As Noted in HPI] : as noted in HPI [Palpitations] : no palpitations [Constipation] : no constipation [Diarrhea] : no diarrhea [Tremors] : no tremors [Cold Intolerance] : no cold intolerance [Heat Intolerance] : no heat intolerance

## 2022-05-18 NOTE — HISTORY OF PRESENT ILLNESS
[FreeTextEntry1] : Interval Hx - feels Left thyroid "thickness", denies pain.\par \par Hashimoto's hypothyroid since 29 yrs old - has been on LT4 for long time. \par currently on Lt4 112 mcg daily on empty stomach\par \par Left thyroid nodule since 2011 - stable\par \par (+) occ voice hoarsenes, denies dysphagia, denies anterior neck pain

## 2022-05-18 NOTE — PHYSICAL EXAM
[Alert] : alert [No Acute Distress] : no acute distress [EOMI] : extra ocular movement intact [No LAD] : no lymphadenopathy [Thyroid Not Enlarged] : the thyroid was not enlarged [No Thyroid Nodules] : no palpable thyroid nodules [No Accessory Muscle Use] : no accessory muscle use [Clear to Auscultation] : lungs were clear to auscultation bilaterally [Normal S1, S2] : normal S1 and S2 [Normal Rate] : heart rate was normal [Normal Reflexes] : deep tendon reflexes were 2+ and symmetric [No Tremors] : no tremors [Oriented x3] : oriented to person, place, and time [Normal Affect] : the affect was normal [Normal Insight/Judgement] : insight and judgment were intact [Normal Mood] : the mood was normal

## 2022-05-18 NOTE — DATA REVIEWED
[FreeTextEntry1] : Thyroid sono 6/13/18\par Left nodule 9x7x7 mm, stable\par \par Thyroid sono 6/18/19\par Left lower pole nodule 8x7x7 mm - stable, echogenic\par \par Thyroid sono 7/10/20\par stable LLP nodule 9x10x9 mm\par \par Thyroid sono 9/2/21\par RUP nodule 4x3x3 mm - new, solid\par LLP nodule 8x6x8 mm - stable, heterogenous, solid\par ------------------------------------------------------------------------------\par Labs 10/18/18\par TSH 0.508\par FT4 1.79\par \par Labs 1/14/19\par TSH 0.681\par FT4 1.45\par \par Labs 6/27/19\par TSH 1.270\par FT4 1.59\par \par Labs 1/3/20 TSH 15.710, FT4 1.24\par \par Labs 7/8/20  , Ft4 0.24\par \par Labs 10/16/20 TSH 1.48, Ft4 1.55\par \par labs 6/29/21 TSH 0.359, Ft4 1.33\par \par labs 11/1/21 TSH 0.838, Ft4 1.32\par \par labs 5/13/22 TSH 0.345, Ft4 1.56

## 2022-05-19 PROBLEM — E03.9 HYPOTHYROIDISM, UNSPECIFIED: Chronic | Status: ACTIVE | Noted: 2021-12-07

## 2022-05-31 ENCOUNTER — NON-APPOINTMENT (OUTPATIENT)
Age: 62
End: 2022-05-31

## 2022-08-24 ENCOUNTER — APPOINTMENT (OUTPATIENT)
Dept: ENDOCRINOLOGY | Facility: CLINIC | Age: 62
End: 2022-08-24

## 2022-09-26 ENCOUNTER — NON-APPOINTMENT (OUTPATIENT)
Age: 62
End: 2022-09-26

## 2022-09-26 ENCOUNTER — APPOINTMENT (OUTPATIENT)
Dept: CARDIOLOGY | Facility: CLINIC | Age: 62
End: 2022-09-26

## 2022-09-26 VITALS
HEIGHT: 67 IN | BODY MASS INDEX: 27.94 KG/M2 | HEART RATE: 66 BPM | WEIGHT: 178 LBS | DIASTOLIC BLOOD PRESSURE: 80 MMHG | SYSTOLIC BLOOD PRESSURE: 152 MMHG | RESPIRATION RATE: 16 BRPM

## 2022-09-26 VITALS — SYSTOLIC BLOOD PRESSURE: 142 MMHG | DIASTOLIC BLOOD PRESSURE: 80 MMHG

## 2022-09-26 PROCEDURE — 99214 OFFICE O/P EST MOD 30 MIN: CPT | Mod: 25

## 2022-09-26 PROCEDURE — 93000 ELECTROCARDIOGRAM COMPLETE: CPT

## 2022-09-26 NOTE — HISTORY OF PRESENT ILLNESS
[FreeTextEntry1] : She did undergo arthroscopic left knee surgery with Dr. Nacho Mott last , but unfortunately she's had some complications with this and has been unable to exercise;\par \par Patient's blood pressures has also been slightly elevated lately in the 140s to 150s over 80s.  However, she does admit to being under a great deal of stress lately helping her daughter with a  child and also helping her deal with hypertension caused by preeclampsia; \par \par She has also been without using her CPAP for approximately 2 months now due to waiting for a replacement part since her mouth piece had broken.  This will hopefully be coming in sometime next week;

## 2022-09-26 NOTE — REASON FOR VISIT
[FreeTextEntry1] : MONIKA SIMMS is being seen for arrhythmia/ECG abnormalities. \par \par The patient is a 62-year-old white female who has a history for borderline abnormal EKG and prior remote history for atypical chest pain. She presents back to the office today for general cardiac checkup;\par \par She is now c/o some worsening occasional twinges of chest discomfort and usually the right parasternal border area which is not particularly exertional related.  This is similar to what she's experienced in the past and there are no associated symptoms.  She had been worked up for this a couple years ago with a normal nuclear stress test;\par \par There has been no known sustained injury to this area.\par Otherwise, she has been asymptomatic for exertional substernal chest pain, diaphoresis, shortness of breath, palpitations or dizziness;

## 2022-09-26 NOTE — ASSESSMENT
[FreeTextEntry1] : EKG shows normal sinus rhythm at a rate of 66 with some late R wave transition V1 to V3. Essentially unchanged.\par \par In summary this 62-year-old woman who has a history of borderline abnormal EKG in the past but otherwise normal cardiac evaluation and normal myocardial perfusion stress test back in 2019;\par Recently developed some rather atypical non-exertional chest pain which was found to be reproducible and exquisite upon palpation in the area described in the office today (costochondritis).  Admits to have been sleeping on her right side lately;\par Also showing some elevated systolic blood pressure lately which is unusual for her.  Does admit to being under a great deal of stress lately helping her daughter out who has developed some preeclampsia hypertension and helping out with her recent .  She has also been without her CPAP for approximately 2 months while waiting for her replacement part, all of which could be contributing to her slightly elevated blood pressures;\par \par \par Plan:\par \par Will remain office anti-hypertensive meds for now.  She is to restart using CPAP once replacement part comes in and try to reduce stress. She will f/u with her PCP in the near future for fasting blood work and repeat blood pressure check;\par \par Reassured patient that her reproducible chest discomfort located in the area she has been complaining of is most likely musculoskeletal or costochondritis.  She may take OTC NSAID's PRN for pain / inflammation. May apply heating pad to affected area periodically. Advised rest and keep well PO hydrated;\par \par Recommend she complete a Transthoracic Echocardiogram and Carotid Doppler in the near future to assess overall cardiac function, valvulopathy and to assess for signs of cardiac remodeling as well as to assess extent of carotid plaquing stenosis respectively;\par \par Recommend patient report any untoward symptoms;\par \par Followup to this office is planned within 6-8 weeks or p.r.n.;

## 2022-09-26 NOTE — PHYSICAL EXAM
[Well Developed] : well developed [Well Nourished] : well nourished [No Acute Distress] : no acute distress [Normal Conjunctiva] : normal conjunctiva [Normal Venous Pressure] : normal venous pressure [Carotid Bruit] : carotid bruit [Normal S1, S2] : normal S1, S2 [No Murmur] : no murmur [No Rub] : no rub [No Gallop] : no gallop [Clear Lung Fields] : clear lung fields [Good Air Entry] : good air entry [No Respiratory Distress] : no respiratory distress  [Soft] : abdomen soft [Non Tender] : non-tender [No Masses/organomegaly] : no masses/organomegaly [Normal Gait] : normal gait [No Edema] : no edema [No Cyanosis] : no cyanosis [No Clubbing] : no clubbing [No Rash] : no rash [No Skin Lesions] : no skin lesions [Moves all extremities] : moves all extremities [No Focal Deficits] : no focal deficits [Normal Speech] : normal speech [Alert and Oriented] : alert and oriented [Normal memory] : normal memory [de-identified] : Left [de-identified] : Exquisite right parasternal chest pain with palpation

## 2022-10-06 ENCOUNTER — RX RENEWAL (OUTPATIENT)
Age: 62
End: 2022-10-06

## 2022-10-07 ENCOUNTER — APPOINTMENT (OUTPATIENT)
Dept: ENDOCRINOLOGY | Facility: CLINIC | Age: 62
End: 2022-10-07

## 2022-10-07 VITALS
DIASTOLIC BLOOD PRESSURE: 70 MMHG | HEART RATE: 60 BPM | SYSTOLIC BLOOD PRESSURE: 110 MMHG | WEIGHT: 171 LBS | HEIGHT: 67 IN | OXYGEN SATURATION: 97 % | BODY MASS INDEX: 26.84 KG/M2

## 2022-10-07 PROCEDURE — 99213 OFFICE O/P EST LOW 20 MIN: CPT

## 2022-10-07 NOTE — ASSESSMENT
[FreeTextEntry1] : 62 year old female with Hashimoto's hypothyroidism and NTMNG. \par \par 1, Hypothyroidism- euthyroid on replacement T4.\par -Continue current dose of LT4.\par -Repeat TFTs in 6 months.\par -RTO for follow-up with Dr Solano in 6 months.\par \par 2. Thyroid nodules- stable on sonogram.\par -Can repeat sonogram May 2023 to monitor nodules.

## 2022-10-07 NOTE — REVIEW OF SYSTEMS
[Fatigue] : no fatigue [Recent Weight Gain (___ Lbs)] : no recent weight gain [Recent Weight Loss (___ Lbs)] : no recent weight loss [Chest Pain] : no chest pain [Palpitations] : no palpitations [Shortness Of Breath] : no shortness of breath [Constipation] : no constipation [Diarrhea] : no diarrhea [Tremors] : no tremors [Depression] : no depression [Anxiety] : no anxiety

## 2022-10-07 NOTE — HISTORY OF PRESENT ILLNESS
[FreeTextEntry1] : INTERVAL HISTORY: Reports feeling well today. No changes to medical history. Lowered dose of LT4 at last visit for TSH 0.3. No change in how she feels on lower dose.\par \par Hashimoto's hypothyroid since 29 yrs old - has been on LT4 for long time. \par Current regimen: LT4 100 mcg patricia, takes appropriately.\par \par Left thyroid nodule since 2011, has been stable.\par Sonogram May 2022: RMP 0.3 x 0.2 x 0.3 cm hyperechoic nodule (stable); LLP 0.9 x 0.8 x 0.7 cm hyperechoic nodule (stable).\par Denies anterior neck pain, dysphagia, and voice changes.\par

## 2022-10-07 NOTE — PHYSICAL EXAM
[Alert] : alert [Well Nourished] : well nourished [No Acute Distress] : no acute distress [Well Developed] : well developed [Normal Sclera/Conjunctiva] : normal sclera/conjunctiva [EOMI] : extra ocular movement intact [No Proptosis] : no proptosis [Thyroid Not Enlarged] : the thyroid was not enlarged [No Thyroid Nodules] : no palpable thyroid nodules [No Respiratory Distress] : no respiratory distress [No Accessory Muscle Use] : no accessory muscle use [Clear to Auscultation] : lungs were clear to auscultation bilaterally [Normal S1, S2] : normal S1 and S2 [Normal Rate] : heart rate was normal [Regular Rhythm] : with a regular rhythm [No Edema] : no peripheral edema [Normal Anterior Cervical Nodes] : no anterior cervical lymphadenopathy [Normal Posterior Cervical Nodes] : no posterior cervical lymphadenopathy [No Stigmata of Cushings Syndrome] : no stigmata of Cushings Syndrome [No Tremors] : no tremors [Oriented x3] : oriented to person, place, and time [Normal Affect] : the affect was normal [Normal Mood] : the mood was normal

## 2022-10-24 ENCOUNTER — APPOINTMENT (OUTPATIENT)
Dept: CARDIOLOGY | Facility: CLINIC | Age: 62
End: 2022-10-24

## 2022-10-24 PROCEDURE — 93306 TTE W/DOPPLER COMPLETE: CPT

## 2022-10-24 PROCEDURE — 93880 EXTRACRANIAL BILAT STUDY: CPT

## 2022-11-22 ENCOUNTER — NON-APPOINTMENT (OUTPATIENT)
Age: 62
End: 2022-11-22

## 2022-11-22 ENCOUNTER — APPOINTMENT (OUTPATIENT)
Dept: CARDIOLOGY | Facility: CLINIC | Age: 62
End: 2022-11-22

## 2022-11-22 VITALS
HEART RATE: 66 BPM | HEIGHT: 67 IN | WEIGHT: 175 LBS | RESPIRATION RATE: 16 BRPM | BODY MASS INDEX: 27.47 KG/M2 | DIASTOLIC BLOOD PRESSURE: 75 MMHG | SYSTOLIC BLOOD PRESSURE: 132 MMHG

## 2022-11-22 PROCEDURE — 93000 ELECTROCARDIOGRAM COMPLETE: CPT

## 2022-11-22 PROCEDURE — 99213 OFFICE O/P EST LOW 20 MIN: CPT | Mod: 25

## 2022-11-22 NOTE — REASON FOR VISIT
[FreeTextEntry1] : MONIKA SIMMS is being seen for arrhythmia/ECG abnormalities. \par \par The patient is a 62-year-old white female who has a history for borderline abnormal EKG and prior remote history for atypical chest pain. She presents back to the office today for general cardiac checkup;\par \par She had come to our office last September c/o some worsening occasional twinges of chest discomfort and usually the right parasternal border area which is not particularly exertional related. This appeared to be costochondritis and was advised to apply heating pads periodically, rest, and to take OTC NSAID's as needed.  This appears to have resolved and she now believes this was musculoskeletal / costochondritis in nature;\par \par Otherwise, she has been asymptomatic for exertional substernal chest pain, diaphoresis, shortness of breath, palpitations or dizziness;

## 2022-11-22 NOTE — HISTORY OF PRESENT ILLNESS
[FreeTextEntry1] : She did undergo arthroscopic left knee surgery with Dr. Nacho Mott last fall, but unfortunately she's had some complications with this and has been unable to exercise;\par \par She was re-fitted for new CPAP for which she has been using and her blood pressures appear to have improved;\par \par Most recent Transthoracic Echocardiogram (10/24/2022):  Normal LV systolic and diastolic function with LVEF of 65%.  The left and right atria normal in size and structure.  LV size and structure is normal.  Mild MR and TR. Trace TN;\par \par Most recent Carotid Doppler (10/24/2022):  There is no evidence of atherosclerotic plaquing bilaterally.  The left and right vertebral artery demonstrates antegrade flow;

## 2022-11-22 NOTE — ASSESSMENT
[FreeTextEntry1] : EKG shows normal sinus rhythm at a rate of 66 with some late R wave transition V1 to V3. Essentially unchanged.\par \par In summary this 62-year-old woman who has a history of borderline abnormal EKG in the past but otherwise normal cardiac evaluation and normal myocardial perfusion stress test back in 2019;\par Recently developed some rather atypical non-exertional chest pain which was found to be reproducible and exquisite upon palpation in the area described in the office today (costochondritis).  Pain has since resolved;\par Now follow up with echocardiogram and carotid doppler which were mostly unremarkable with overall normal LV systolic function, mild valvulopathy and no signs of cardiac remodelling.  There was also no signs for carotid plaquing;\par \par \par Plan:\par \par Patient reassured; \par \par Diet and lifestyle modification discussed including low sodium, low fat and low carbohydrate diet.  Patient is to implement aerobic exercise regimen few days per week;\par \par Follow up with PCP regarding routine checkups and fasting blood work including lipid panel.  Forward all testing/lab work to our office;\par \par Recommend patient report any untoward symptoms; \par \par Followup with Dr. Pickett within 6 months or p.r.n.;

## 2022-12-30 ENCOUNTER — RX RENEWAL (OUTPATIENT)
Age: 62
End: 2022-12-30

## 2023-04-14 NOTE — HISTORY OF PRESENT ILLNESS
[FreeTextEntry1] : Hashimoto's hypothyroid since 29 yrs old - has been on LT4 for long time. \par currently on Lt4 112 mcg daily on empty stomach, prior to recent labs had strep throat and was on Abx\par weight stable, denies palpitations/changes in bowel habits\par tired all the time but also with sleep apnea\par \par Left thyroid nodule since 2011 - stable\par denies dysphagia, voice changes, anterior neck pain No

## 2023-04-17 LAB — TSH SERPL-ACNC: 0.7

## 2023-04-18 ENCOUNTER — APPOINTMENT (OUTPATIENT)
Dept: ENDOCRINOLOGY | Facility: CLINIC | Age: 63
End: 2023-04-18
Payer: COMMERCIAL

## 2023-04-18 VITALS
HEIGHT: 67 IN | DIASTOLIC BLOOD PRESSURE: 80 MMHG | OXYGEN SATURATION: 97 % | WEIGHT: 170 LBS | BODY MASS INDEX: 26.68 KG/M2 | HEART RATE: 78 BPM | SYSTOLIC BLOOD PRESSURE: 124 MMHG

## 2023-04-18 PROCEDURE — 99214 OFFICE O/P EST MOD 30 MIN: CPT

## 2023-04-18 NOTE — DATA REVIEWED
[FreeTextEntry1] : Thyroid sono 6/13/18\par Left nodule 9x7x7 mm, stable\par \par Thyroid sono 6/18/19\par Left lower pole nodule 8x7x7 mm - stable, echogenic\par \par Thyroid sono 7/10/20\par stable LLP nodule 9x10x9 mm\par \par Thyroid sono 9/2/21\par RUP nodule 4x3x3 mm - new, solid\par LLP nodule 8x6x8 mm - stable, heterogenous, solid\par \par Thyroid sono 5/26/22\par RMP nodule 3x2x3 mm - hyperechoic, stable\par LLP nodule 9x8x7 mm - hyperechoic, stable\par ------------------------------------------------------------------------------\par Labs 10/18/18\par TSH 0.508\par FT4 1.79\par \par Labs 1/14/19\par TSH 0.681\par FT4 1.45\par \par Labs 6/27/19\par TSH 1.270\par FT4 1.59\par \par Labs 1/3/20 TSH 15.710, FT4 1.24\par \par Labs 7/8/20 , Ft4 0.24\par \par Labs 10/16/20 TSH 1.48, Ft4 1.55\par \par labs 6/29/21 TSH 0.359, Ft4 1.33\par \par labs 11/1/21 TSH 0.838, Ft4 1.32\par \par labs 5/13/22 TSH 0.345, Ft4 1.56. \par Labs 4/10/23 TSH 0.705, FT4 1.61

## 2023-04-18 NOTE — HISTORY OF PRESENT ILLNESS
[FreeTextEntry1] : INTERVAL HISTORY: winter 2022, ear infx resulted in hearing loss, weight loss\par \par Hashimoto's hypothyroid since 29 yrs old - has been on LT4 for long time. \par Current regimen: LT4 100 mcg daily, takes appropriately.\par \par Left thyroid nodule since 2011, has been stable.\par Sonogram May 2022: RMP 0.3 x 0.2 x 0.3 cm hyperechoic nodule (stable); LLP 0.9 x 0.8 x 0.7 cm hyperechoic nodule (stable).\par Denies anterior neck pain, dysphagia, and voice changes.\par

## 2023-04-18 NOTE — ASSESSMENT
[FreeTextEntry1] : 62 year old female with Hashimoto's hypothyroidism and NTMNG. \par \par 1, Hypothyroidism- TSH low normal, high normal FT4, has lost weight\par - decrease LT4 88 mcg daily\par -Repeat TFTs in 3 months.\par \par 2. Thyroid nodules- stable on sonogram.\par -Can repeat sonogram May 2023 to monitor nodules.\par \par 3. Bone health - no OP by history, DXA monitored by Gyn

## 2023-04-18 NOTE — PHYSICAL EXAM
[Alert] : alert [Well Nourished] : well nourished [No Acute Distress] : no acute distress [Well Developed] : well developed [Normal Sclera/Conjunctiva] : normal sclera/conjunctiva [No Proptosis] : no proptosis [EOMI] : extra ocular movement intact [Thyroid Not Enlarged] : the thyroid was not enlarged [No Thyroid Nodules] : no palpable thyroid nodules [No Respiratory Distress] : no respiratory distress [Clear to Auscultation] : lungs were clear to auscultation bilaterally [Normal S1, S2] : normal S1 and S2 [Normal Rate] : heart rate was normal [No Edema] : no peripheral edema [Normal Anterior Cervical Nodes] : no anterior cervical lymphadenopathy [No Stigmata of Cushings Syndrome] : no stigmata of Cushings Syndrome [No Tremors] : no tremors [Oriented x3] : oriented to person, place, and time [Normal Affect] : the affect was normal [Normal Mood] : the mood was normal

## 2023-05-19 ENCOUNTER — NON-APPOINTMENT (OUTPATIENT)
Age: 63
End: 2023-05-19

## 2023-05-19 ENCOUNTER — APPOINTMENT (OUTPATIENT)
Dept: CARDIOLOGY | Facility: CLINIC | Age: 63
End: 2023-05-19
Payer: COMMERCIAL

## 2023-05-19 VITALS
HEIGHT: 67 IN | RESPIRATION RATE: 16 BRPM | WEIGHT: 178 LBS | SYSTOLIC BLOOD PRESSURE: 134 MMHG | BODY MASS INDEX: 27.94 KG/M2 | HEART RATE: 76 BPM | DIASTOLIC BLOOD PRESSURE: 70 MMHG

## 2023-05-19 DIAGNOSIS — R94.39 ABNORMAL RESULT OF OTHER CARDIOVASCULAR FUNCTION STUDY: ICD-10-CM

## 2023-05-19 PROCEDURE — 93000 ELECTROCARDIOGRAM COMPLETE: CPT

## 2023-05-19 PROCEDURE — 99214 OFFICE O/P EST MOD 30 MIN: CPT | Mod: 25

## 2023-05-19 NOTE — REASON FOR VISIT
[FreeTextEntry1] : MONIKA SIMMS is being seen for arrhythmia/ECG abnormalities. \par \par The patient is a 63-year-old white female who has a history for borderline abnormal EKG and prior remote history for atypical chest pain. She presents back to the office today for general cardiac checkup;\par \par She had come to our office last September c/o some worsening occasional twinges of chest discomfort and usually the right parasternal border area but ultimately there was determined to be most likely costochondritis;\par \par Overall, lately patient has been stable without symptoms for chest pain, shortness of breath, palpitations or dizziness;

## 2023-05-19 NOTE — HISTORY OF PRESENT ILLNESS
[FreeTextEntry1] : Patient did attempt to run a half marathon this past fall but unfortunately she had some joint and muscle injuries as a result but otherwise cardiovascular speaking, she did well ;\par \par Patient has a history of EDER and continues to use a full mask CPAP machine nightly;\par There is some family history for heart disease for her mothers  side;\par \par \par She was re-fitted for new CPAP for which she has been using and her blood pressures appear to have improved;\par \par Most recent Transthoracic Echocardiogram (10/24/2022):  Normal LV systolic and diastolic function with LVEF of 65%.  The left and right atria normal in size and structure.  LV size and structure is normal.  Mild MR and TR. Trace IL;\par \par Most recent Carotid Doppler (10/24/2022):  There is no evidence of atherosclerotic plaquing bilaterally.  The left and right vertebral artery demonstrates antegrade flow;

## 2023-05-19 NOTE — ASSESSMENT
[FreeTextEntry1] : EKG shows normal sinus rhythm at a rate of 76 with some late R wave transition V1 to V3. Essentially unchanged.\par \par In summary this 63-year-old woman who has a history of borderline abnormal EKG in the past but otherwise normal cardiac evaluation and normal myocardial perfusion stress test back in 2019;\par \par Despite previous atypical chest pain history she has been doing well more recently and had been physically active exercising and recently ran a half marathon;\par \par \par Plan:\par \par Patient reassured; \par \par Diet and lifestyle modification discussed including low sodium, low fat and low carbohydrate diet.  Patient is to implement aerobic exercise regimen few days per week;\par \par Follow up with PCP regarding routine checkups and fasting blood work including lipid panel.  Forward all testing/lab work to our office;\par \par Discussed possibility of repeating cardiac stress test in the future, but not urgent at this time;\par \par Followup within 5 to 6 months;

## 2023-06-25 NOTE — ASSESSMENT
[FreeTextEntry1] : 1. hashimoto's hypothyroid - clinically and biochemically euthyroid, TSH low normal\par - decrease LT4 100 mcg daily, Rx sent\par - repeat TFTs 1 week before next visit\par \par 2. stable Left thyroid nodule, new small Right thyroid nodule - feels something in Left side of neck, check thyroid sonogram
Time-based billing (NON-critical care)

## 2023-08-07 LAB — TSH SERPL-ACNC: 3.94

## 2023-08-08 ENCOUNTER — APPOINTMENT (OUTPATIENT)
Dept: ENDOCRINOLOGY | Facility: CLINIC | Age: 63
End: 2023-08-08
Payer: COMMERCIAL

## 2023-08-08 VITALS
HEART RATE: 68 BPM | SYSTOLIC BLOOD PRESSURE: 130 MMHG | OXYGEN SATURATION: 97 % | HEIGHT: 67 IN | DIASTOLIC BLOOD PRESSURE: 78 MMHG | BODY MASS INDEX: 27.47 KG/M2 | WEIGHT: 175 LBS

## 2023-08-08 PROCEDURE — 99214 OFFICE O/P EST MOD 30 MIN: CPT

## 2023-08-08 NOTE — PHYSICAL EXAM
[Alert] : alert [Well Nourished] : well nourished [No Acute Distress] : no acute distress [Well Developed] : well developed [Normal Sclera/Conjunctiva] : normal sclera/conjunctiva [EOMI] : extra ocular movement intact [No Proptosis] : no proptosis [Thyroid Not Enlarged] : the thyroid was not enlarged [No Thyroid Nodules] : no palpable thyroid nodules [No Respiratory Distress] : no respiratory distress [Clear to Auscultation] : lungs were clear to auscultation bilaterally [Normal S1, S2] : normal S1 and S2 [Normal Rate] : heart rate was normal [No Edema] : no peripheral edema [Normal Anterior Cervical Nodes] : no anterior cervical lymphadenopathy [No Stigmata of Cushings Syndrome] : no stigmata of Cushings Syndrome [No Tremors] : no tremors [Oriented x3] : oriented to person, place, and time [Normal Affect] : the affect was normal [Normal Mood] : the mood was normal

## 2023-08-08 NOTE — DATA REVIEWED
[FreeTextEntry1] : Thyroid sono 6/13/18 Left nodule 9x7x7 mm, stable  Thyroid sono 6/18/19 Left lower pole nodule 8x7x7 mm - stable, echogenic  Thyroid sono 7/10/20 stable LLP nodule 9x10x9 mm  Thyroid sono 9/2/21 RUP nodule 4x3x3 mm - new, solid LLP nodule 8x6x8 mm - stable, heterogenous, solid  Thyroid sono 5/26/22 RMP nodule 3x2x3 mm - hyperechoic, stable LLP nodule 9x8x7 mm - hyperechoic, stable  Thyroid sonogram 8/2/23 Left LP nodule 9 mm - TR3, solid, hyperechoic ------------------------------------------------------------------------------ Labs 10/18/18 TSH 0.508 FT4 1.79  Labs 1/14/19 TSH 0.681 FT4 1.45  Labs 6/27/19 TSH 1.270 FT4 1.59  Labs 1/3/20 TSH 15.710, FT4 1.24  Labs 7/8/20 , Ft4 0.24  Labs 10/16/20 TSH 1.48, Ft4 1.55  labs 6/29/21 TSH 0.359, Ft4 1.33  labs 11/1/21 TSH 0.838, Ft4 1.32  labs 5/13/22 TSH 0.345, Ft4 1.56.  Labs 4/10/23 TSH 0.705, FT4 1.61 Labs 7/31/23 TSH 3.94, FT4 1.10 Benzoyl Peroxide Counseling: Patient counseled that medicine may cause skin irritation and bleach clothing.  In the event of skin irritation, the patient was advised to reduce the amount of the drug applied or use it less frequently.   The patient verbalized understanding of the proper use and possible adverse effects of benzoyl peroxide.  All of the patient's questions and concerns were addressed.

## 2023-08-08 NOTE — ASSESSMENT
[FreeTextEntry1] : 63 year old female with Hashimoto's hypothyroidism and NTMNG.   1, Hypothyroidism- normal TSH and she is euthyroid - cont LT4 88 mcg daily -Repeat TFTs in 6 months.  2. Thyroid nodule - left subcm nodule, stable for past 4-5 yeARS, without suspicious sonographic features -Can repeat sonogram in 2-3 years  3. Bone health - no OP by history, DXA monitored by Gyn

## 2023-08-08 NOTE — HISTORY OF PRESENT ILLNESS
[FreeTextEntry1] : INTERVAL HISTORY: no issues, no changes  Hashimoto's hypothyroid since 29 yrs old - has been on LT4 for long time.  Current regimen: LT4 88 mcg daily, takes appropriately.  Left thyroid nodule since 2011, has been stable. Denies anterior neck pain, dysphagia, and voice changes.

## 2023-11-03 ENCOUNTER — NON-APPOINTMENT (OUTPATIENT)
Age: 63
End: 2023-11-03

## 2023-11-03 ENCOUNTER — APPOINTMENT (OUTPATIENT)
Dept: CARDIOLOGY | Facility: CLINIC | Age: 63
End: 2023-11-03
Payer: COMMERCIAL

## 2023-11-03 VITALS
RESPIRATION RATE: 16 BRPM | HEART RATE: 75 BPM | DIASTOLIC BLOOD PRESSURE: 82 MMHG | WEIGHT: 184 LBS | SYSTOLIC BLOOD PRESSURE: 136 MMHG | HEIGHT: 67 IN | BODY MASS INDEX: 28.88 KG/M2

## 2023-11-03 PROCEDURE — 93000 ELECTROCARDIOGRAM COMPLETE: CPT

## 2023-11-03 PROCEDURE — 99214 OFFICE O/P EST MOD 30 MIN: CPT | Mod: 25

## 2024-02-14 ENCOUNTER — APPOINTMENT (OUTPATIENT)
Dept: ENDOCRINOLOGY | Facility: CLINIC | Age: 64
End: 2024-02-14

## 2024-02-16 ENCOUNTER — APPOINTMENT (OUTPATIENT)
Dept: ENDOCRINOLOGY | Facility: CLINIC | Age: 64
End: 2024-02-16

## 2024-04-03 ENCOUNTER — APPOINTMENT (OUTPATIENT)
Dept: ENDOCRINOLOGY | Facility: CLINIC | Age: 64
End: 2024-04-03
Payer: COMMERCIAL

## 2024-04-03 VITALS
DIASTOLIC BLOOD PRESSURE: 72 MMHG | HEIGHT: 67 IN | SYSTOLIC BLOOD PRESSURE: 130 MMHG | WEIGHT: 182 LBS | BODY MASS INDEX: 28.56 KG/M2 | HEART RATE: 78 BPM | OXYGEN SATURATION: 94 %

## 2024-04-03 DIAGNOSIS — E06.3 AUTOIMMUNE THYROIDITIS: ICD-10-CM

## 2024-04-03 DIAGNOSIS — E04.1 NONTOXIC SINGLE THYROID NODULE: ICD-10-CM

## 2024-04-03 PROCEDURE — 99214 OFFICE O/P EST MOD 30 MIN: CPT

## 2024-04-03 RX ORDER — LEVOTHYROXINE SODIUM 0.09 MG/1
88 TABLET ORAL DAILY
Qty: 90 | Refills: 3 | Status: ACTIVE | COMMUNITY
Start: 2021-08-27 | End: 1900-01-01

## 2024-04-03 NOTE — DATA REVIEWED
[FreeTextEntry1] : Thyroid sono 6/13/18 Left nodule 9x7x7 mm, stable  Thyroid sono 6/18/19 Left lower pole nodule 8x7x7 mm - stable, echogenic  Thyroid sono 7/10/20 stable LLP nodule 9x10x9 mm  Thyroid sono 9/2/21 RUP nodule 4x3x3 mm - new, solid LLP nodule 8x6x8 mm - stable, heterogenous, solid  Thyroid sono 5/26/22 RMP nodule 3x2x3 mm - hyperechoic, stable LLP nodule 9x8x7 mm - hyperechoic, stable  Thyroid sonogram 8/2/23 Left LP nodule 9 mm - TR3, solid, hyperechoic ------------------------------------------------------------------------------ Labs 10/18/18 TSH 0.508 FT4 1.79  Labs 1/14/19 TSH 0.681 FT4 1.45  Labs 6/27/19 TSH 1.270 FT4 1.59  Labs 1/3/20 TSH 15.710, FT4 1.24  Labs 7/8/20 , Ft4 0.24  Labs 10/16/20 TSH 1.48, Ft4 1.55  labs 6/29/21 TSH 0.359, Ft4 1.33  labs 11/1/21 TSH 0.838, Ft4 1.32  labs 5/13/22 TSH 0.345, Ft4 1.56.  Labs 4/10/23 TSH 0.705, FT4 1.61 Labs 7/31/23 TSH 3.94, FT4 1.10 Labs 3/25/24 TSH 3.12, FT4 1.28

## 2024-04-03 NOTE — REVIEW OF SYSTEMS
[Recent Weight Gain (___ Lbs)] : no recent weight gain [Fatigue] : no fatigue [Chest Pain] : no chest pain [Recent Weight Loss (___ Lbs)] : no recent weight loss [Palpitations] : no palpitations [Shortness Of Breath] : no shortness of breath [Diarrhea] : no diarrhea [Constipation] : no constipation [Tremors] : no tremors [Depression] : no depression [Anxiety] : no anxiety

## 2024-04-03 NOTE — ASSESSMENT
[FreeTextEntry1] : 63 year old female with Hashimoto's hypothyroidism and NTMNG.   1, Hypothyroidism- normal TSH and she is euthyroid - cont LT4 88 mcg daily, rx sent  2. Thyroid nodule - left subcm nodule, stable for past 4-5 years, without suspicious sonographic features -Can repeat sonogram later this year and then periodically if stable  3. Bone health - no OP by history, DXA monitored by Gyn  f/u 1 year

## 2024-05-10 ENCOUNTER — NON-APPOINTMENT (OUTPATIENT)
Age: 64
End: 2024-05-10

## 2024-05-10 ENCOUNTER — APPOINTMENT (OUTPATIENT)
Dept: CARDIOLOGY | Facility: CLINIC | Age: 64
End: 2024-05-10
Payer: COMMERCIAL

## 2024-05-10 VITALS
HEIGHT: 67 IN | DIASTOLIC BLOOD PRESSURE: 64 MMHG | RESPIRATION RATE: 16 BRPM | SYSTOLIC BLOOD PRESSURE: 124 MMHG | BODY MASS INDEX: 28.09 KG/M2 | WEIGHT: 179 LBS | HEART RATE: 68 BPM

## 2024-05-10 DIAGNOSIS — R09.89 OTHER SPECIFIED SYMPTOMS AND SIGNS INVOLVING THE CIRCULATORY AND RESPIRATORY SYSTEMS: ICD-10-CM

## 2024-05-10 DIAGNOSIS — I10 ESSENTIAL (PRIMARY) HYPERTENSION: ICD-10-CM

## 2024-05-10 DIAGNOSIS — G47.33 OBSTRUCTIVE SLEEP APNEA (ADULT) (PEDIATRIC): ICD-10-CM

## 2024-05-10 DIAGNOSIS — R07.89 OTHER CHEST PAIN: ICD-10-CM

## 2024-05-10 DIAGNOSIS — M94.0 CHONDROCOSTAL JUNCTION SYNDROME [TIETZE]: ICD-10-CM

## 2024-05-10 PROCEDURE — 93000 ELECTROCARDIOGRAM COMPLETE: CPT

## 2024-05-10 PROCEDURE — 99214 OFFICE O/P EST MOD 30 MIN: CPT

## 2024-05-10 PROCEDURE — G2211 COMPLEX E/M VISIT ADD ON: CPT | Mod: NC,1L
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
## 2024-05-10 NOTE — ASSESSMENT
[FreeTextEntry1] : EKG demonstrating normal sinus rhythm at a rate of 68 ; late R wave transition V2 to V3 but otherwise no acute changes;  In summary, the patient is a 64-year-old woman with a history for borderline hyperlipidemia with LDL of 111 and total cholesterol of 215 with HDL of 46 and triglycerides of 290 recently.    She has been stable from the cardiac standpoint; she reports she continues to be physically active and exercise on a regular basis; she only complains of occasional exertional dyspnea and fatigue;     Plan:  Reinforced importance of trying to pursue a low-carb low-fat diet and get back into moderate physical activity and exercise;  Could try omega-3 fish oil supplement to help lower triglycerides for the future as well;  Recommend scheduling nuclear stress test at some time prior to the next visit within 5 to 6 months;  Continue regular checkups and laboratory blood test with primary care encouraged;

## 2024-05-10 NOTE — REASON FOR VISIT
[FreeTextEntry1] : MONIKA SIMMS is being seen for arrhythmia/ECG abnormalities.   The patient is a 64-year-old white female who has a history for borderline abnormal EKG and prior remote history for atypical chest pain. She presents back to the office today for general cardiac checkup;  Earlier last year the patient did have some atypical chest discomfort but this seems to have improved and did not appear to be cardiac related;  Overall, lately patient has been stable without symptoms for chest pain, shortness of breath, palpitations or dizziness;

## 2024-10-04 ENCOUNTER — APPOINTMENT (OUTPATIENT)
Dept: CARDIOLOGY | Facility: CLINIC | Age: 64
End: 2024-10-04

## 2024-11-13 ENCOUNTER — APPOINTMENT (OUTPATIENT)
Dept: OBGYN | Facility: CLINIC | Age: 64
End: 2024-11-13
Payer: COMMERCIAL

## 2024-11-13 VITALS
HEIGHT: 67 IN | WEIGHT: 180 LBS | DIASTOLIC BLOOD PRESSURE: 74 MMHG | SYSTOLIC BLOOD PRESSURE: 128 MMHG | BODY MASS INDEX: 28.25 KG/M2

## 2024-11-13 DIAGNOSIS — Z80.0 FAMILY HISTORY OF MALIGNANT NEOPLASM OF DIGESTIVE ORGANS: ICD-10-CM

## 2024-11-13 DIAGNOSIS — Z12.39 ENCOUNTER FOR OTHER SCREENING FOR MALIGNANT NEOPLASM OF BREAST: ICD-10-CM

## 2024-11-13 DIAGNOSIS — Z80.3 FAMILY HISTORY OF MALIGNANT NEOPLASM OF BREAST: ICD-10-CM

## 2024-11-13 DIAGNOSIS — Z12.4 ENCOUNTER FOR SCREENING FOR MALIGNANT NEOPLASM OF CERVIX: ICD-10-CM

## 2024-11-13 DIAGNOSIS — Z80.7 FAMILY HISTORY OF OTHER MALIGNANT NEOPLASMS OF LYMPHOID, HEMATOPOIETIC AND RELATED TISSUES: ICD-10-CM

## 2024-11-13 DIAGNOSIS — Z01.419 ENCOUNTER FOR GYNECOLOGICAL EXAMINATION (GENERAL) (ROUTINE) W/OUT ABNORMAL FINDINGS: ICD-10-CM

## 2024-11-13 PROCEDURE — 99387 INIT PM E/M NEW PAT 65+ YRS: CPT

## 2024-11-13 PROCEDURE — 99397 PER PM REEVAL EST PAT 65+ YR: CPT

## 2024-11-18 LAB — CYTOLOGY CVX/VAG DOC THIN PREP: ABNORMAL

## 2024-12-04 ENCOUNTER — APPOINTMENT (OUTPATIENT)
Dept: CARDIOLOGY | Facility: CLINIC | Age: 64
End: 2024-12-04
Payer: COMMERCIAL

## 2024-12-04 PROCEDURE — 78452 HT MUSCLE IMAGE SPECT MULT: CPT

## 2024-12-04 PROCEDURE — 93015 CV STRESS TEST SUPVJ I&R: CPT

## 2024-12-04 PROCEDURE — A9500: CPT

## 2025-02-21 ENCOUNTER — APPOINTMENT (OUTPATIENT)
Dept: CARDIOLOGY | Facility: CLINIC | Age: 65
End: 2025-02-21
Payer: COMMERCIAL

## 2025-02-21 ENCOUNTER — NON-APPOINTMENT (OUTPATIENT)
Age: 65
End: 2025-02-21

## 2025-02-21 VITALS
WEIGHT: 179 LBS | DIASTOLIC BLOOD PRESSURE: 64 MMHG | BODY MASS INDEX: 28.09 KG/M2 | HEART RATE: 83 BPM | HEIGHT: 67 IN | SYSTOLIC BLOOD PRESSURE: 128 MMHG | RESPIRATION RATE: 16 BRPM

## 2025-02-21 DIAGNOSIS — R07.89 OTHER CHEST PAIN: ICD-10-CM

## 2025-02-21 DIAGNOSIS — I34.0 NONRHEUMATIC MITRAL (VALVE) INSUFFICIENCY: ICD-10-CM

## 2025-02-21 DIAGNOSIS — G47.33 OBSTRUCTIVE SLEEP APNEA (ADULT) (PEDIATRIC): ICD-10-CM

## 2025-02-21 DIAGNOSIS — I10 ESSENTIAL (PRIMARY) HYPERTENSION: ICD-10-CM

## 2025-02-21 PROCEDURE — 93000 ELECTROCARDIOGRAM COMPLETE: CPT

## 2025-02-21 PROCEDURE — 99214 OFFICE O/P EST MOD 30 MIN: CPT

## 2025-02-21 PROCEDURE — G2211 COMPLEX E/M VISIT ADD ON: CPT | Mod: NC

## 2025-04-01 LAB — TSH SERPL-ACNC: 2.2

## 2025-04-02 ENCOUNTER — APPOINTMENT (OUTPATIENT)
Dept: ENDOCRINOLOGY | Facility: CLINIC | Age: 65
End: 2025-04-02
Payer: COMMERCIAL

## 2025-04-02 VITALS
WEIGHT: 178 LBS | BODY MASS INDEX: 27.94 KG/M2 | SYSTOLIC BLOOD PRESSURE: 130 MMHG | HEIGHT: 67 IN | HEART RATE: 84 BPM | OXYGEN SATURATION: 95 % | DIASTOLIC BLOOD PRESSURE: 70 MMHG

## 2025-04-02 DIAGNOSIS — E04.1 NONTOXIC SINGLE THYROID NODULE: ICD-10-CM

## 2025-04-02 DIAGNOSIS — E06.3 AUTOIMMUNE THYROIDITIS: ICD-10-CM

## 2025-04-02 PROCEDURE — 99214 OFFICE O/P EST MOD 30 MIN: CPT

## 2025-04-26 NOTE — PHYSICAL EXAM
[Alert] : alert [Well Nourished] : well nourished [No Acute Distress] : no acute distress [Well Developed] : well developed [Normal Sclera/Conjunctiva] : normal sclera/conjunctiva [EOMI] : extra ocular movement intact [No Proptosis] : no proptosis [Thyroid Not Enlarged] : the thyroid was not enlarged [No Thyroid Nodules] : no palpable thyroid nodules [No Respiratory Distress] : no respiratory distress [Normal S1, S2] : normal S1 and S2 [Clear to Auscultation] : lungs were clear to auscultation bilaterally [Normal Rate] : heart rate was normal [No Tremors] : no tremors [Oriented x3] : oriented to person, place, and time [Normal Affect] : the affect was normal [Normal Mood] : the mood was normal [Normal Insight/Judgement] : insight and judgment were intact Yes...

## 2025-07-29 ENCOUNTER — APPOINTMENT (OUTPATIENT)
Dept: CARDIOLOGY | Facility: CLINIC | Age: 65
End: 2025-07-29
Payer: COMMERCIAL

## 2025-07-29 PROCEDURE — 93880 EXTRACRANIAL BILAT STUDY: CPT

## 2025-07-29 PROCEDURE — 93306 TTE W/DOPPLER COMPLETE: CPT

## 2025-08-15 ENCOUNTER — APPOINTMENT (OUTPATIENT)
Dept: CARDIOLOGY | Facility: CLINIC | Age: 65
End: 2025-08-15

## 2025-08-15 VITALS
HEIGHT: 67 IN | DIASTOLIC BLOOD PRESSURE: 80 MMHG | WEIGHT: 184 LBS | SYSTOLIC BLOOD PRESSURE: 130 MMHG | BODY MASS INDEX: 28.88 KG/M2

## 2025-08-15 DIAGNOSIS — R07.89 OTHER CHEST PAIN: ICD-10-CM

## 2025-08-15 DIAGNOSIS — I10 ESSENTIAL (PRIMARY) HYPERTENSION: ICD-10-CM

## 2025-08-15 DIAGNOSIS — I34.0 NONRHEUMATIC MITRAL (VALVE) INSUFFICIENCY: ICD-10-CM

## 2025-08-15 DIAGNOSIS — G47.33 OBSTRUCTIVE SLEEP APNEA (ADULT) (PEDIATRIC): ICD-10-CM

## 2025-08-15 PROCEDURE — 99213 OFFICE O/P EST LOW 20 MIN: CPT

## 2025-08-15 PROCEDURE — G2211 COMPLEX E/M VISIT ADD ON: CPT | Mod: NC
